# Patient Record
Sex: FEMALE | Race: BLACK OR AFRICAN AMERICAN | NOT HISPANIC OR LATINO | Employment: OTHER | URBAN - METROPOLITAN AREA
[De-identification: names, ages, dates, MRNs, and addresses within clinical notes are randomized per-mention and may not be internally consistent; named-entity substitution may affect disease eponyms.]

---

## 2023-08-26 ENCOUNTER — HOSPITAL ENCOUNTER (INPATIENT)
Facility: HOSPITAL | Age: 73
LOS: 1 days | Discharge: HOME | DRG: 149 | End: 2023-08-28
Attending: EMERGENCY MEDICINE | Admitting: INTERNAL MEDICINE
Payer: COMMERCIAL

## 2023-08-26 ENCOUNTER — APPOINTMENT (OUTPATIENT)
Dept: RADIOLOGY | Facility: HOSPITAL | Age: 73
DRG: 149 | End: 2023-08-26
Payer: COMMERCIAL

## 2023-08-26 ENCOUNTER — APPOINTMENT (EMERGENCY)
Dept: RADIOLOGY | Facility: HOSPITAL | Age: 73
DRG: 149 | End: 2023-08-26
Payer: COMMERCIAL

## 2023-08-26 DIAGNOSIS — R42 DIZZINESS: ICD-10-CM

## 2023-08-26 DIAGNOSIS — R42 LIGHTHEADEDNESS: Primary | ICD-10-CM

## 2023-08-26 PROBLEM — R21 RASH: Status: ACTIVE | Noted: 2023-08-26

## 2023-08-26 PROBLEM — R03.0 ELEVATED BP WITHOUT DIAGNOSIS OF HYPERTENSION: Status: ACTIVE | Noted: 2023-08-26

## 2023-08-26 PROBLEM — E87.6 HYPOKALEMIA: Status: ACTIVE | Noted: 2023-08-26

## 2023-08-26 PROBLEM — R00.1 BRADYCARDIA: Status: ACTIVE | Noted: 2023-08-26

## 2023-08-26 LAB
2HR DELTA HS TROPONIN: 1 NG/L
ALBUMIN SERPL BCP-MCNC: 4.1 G/DL (ref 3.5–5)
ALP SERPL-CCNC: 77 U/L (ref 34–104)
ALT SERPL W P-5'-P-CCNC: 12 U/L (ref 7–52)
ANION GAP SERPL CALCULATED.3IONS-SCNC: 11 MMOL/L
AST SERPL W P-5'-P-CCNC: 11 U/L (ref 13–39)
BACTERIA UR QL AUTO: ABNORMAL /HPF
BASOPHILS # BLD AUTO: 0.02 THOUSANDS/ÂΜL (ref 0–0.1)
BASOPHILS NFR BLD AUTO: 0 % (ref 0–1)
BILIRUB SERPL-MCNC: 1.07 MG/DL (ref 0.2–1)
BILIRUB UR QL STRIP: ABNORMAL
BUN SERPL-MCNC: 15 MG/DL (ref 5–25)
CALCIUM SERPL-MCNC: 9.4 MG/DL (ref 8.4–10.2)
CARDIAC TROPONIN I PNL SERPL HS: 7 NG/L
CARDIAC TROPONIN I PNL SERPL HS: 8 NG/L
CHLORIDE SERPL-SCNC: 99 MMOL/L (ref 96–108)
CLARITY UR: CLEAR
CO2 SERPL-SCNC: 28 MMOL/L (ref 21–32)
COLOR UR: ABNORMAL
CREAT SERPL-MCNC: 0.72 MG/DL (ref 0.6–1.3)
EOSINOPHIL # BLD AUTO: 0.03 THOUSAND/ÂΜL (ref 0–0.61)
EOSINOPHIL NFR BLD AUTO: 1 % (ref 0–6)
ERYTHROCYTE [DISTWIDTH] IN BLOOD BY AUTOMATED COUNT: 13.3 % (ref 11.6–15.1)
GFR SERPL CREATININE-BSD FRML MDRD: 83 ML/MIN/1.73SQ M
GLUCOSE SERPL-MCNC: 80 MG/DL (ref 65–140)
GLUCOSE UR STRIP-MCNC: NEGATIVE MG/DL
HCT VFR BLD AUTO: 48.8 % (ref 34.8–46.1)
HGB BLD-MCNC: 16.2 G/DL (ref 11.5–15.4)
HGB UR QL STRIP.AUTO: ABNORMAL
IMM GRANULOCYTES # BLD AUTO: 0.01 THOUSAND/UL (ref 0–0.2)
IMM GRANULOCYTES NFR BLD AUTO: 0 % (ref 0–2)
KETONES UR STRIP-MCNC: ABNORMAL MG/DL
LEUKOCYTE ESTERASE UR QL STRIP: ABNORMAL
LIPASE SERPL-CCNC: 43 U/L (ref 11–82)
LYMPHOCYTES # BLD AUTO: 1.67 THOUSANDS/ÂΜL (ref 0.6–4.47)
LYMPHOCYTES NFR BLD AUTO: 27 % (ref 14–44)
MCH RBC QN AUTO: 28.2 PG (ref 26.8–34.3)
MCHC RBC AUTO-ENTMCNC: 33.2 G/DL (ref 31.4–37.4)
MCV RBC AUTO: 85 FL (ref 82–98)
MONOCYTES # BLD AUTO: 0.62 THOUSAND/ÂΜL (ref 0.17–1.22)
MONOCYTES NFR BLD AUTO: 10 % (ref 4–12)
MUCOUS THREADS UR QL AUTO: ABNORMAL
NEUTROPHILS # BLD AUTO: 3.8 THOUSANDS/ÂΜL (ref 1.85–7.62)
NEUTS SEG NFR BLD AUTO: 62 % (ref 43–75)
NITRITE UR QL STRIP: NEGATIVE
NON-SQ EPI CELLS URNS QL MICRO: ABNORMAL /HPF
NRBC BLD AUTO-RTO: 0 /100 WBCS
PH UR STRIP.AUTO: 6.5 [PH]
PLATELET # BLD AUTO: 237 THOUSANDS/UL (ref 149–390)
PMV BLD AUTO: 11.8 FL (ref 8.9–12.7)
POTASSIUM SERPL-SCNC: 3.4 MMOL/L (ref 3.5–5.3)
PROT SERPL-MCNC: 7.5 G/DL (ref 6.4–8.4)
PROT UR STRIP-MCNC: NEGATIVE MG/DL
RBC # BLD AUTO: 5.74 MILLION/UL (ref 3.81–5.12)
RBC #/AREA URNS AUTO: ABNORMAL /HPF
SODIUM SERPL-SCNC: 138 MMOL/L (ref 135–147)
SP GR UR STRIP.AUTO: 1.02 (ref 1–1.03)
UROBILINOGEN UR QL STRIP.AUTO: 1 E.U./DL
WBC # BLD AUTO: 6.15 THOUSAND/UL (ref 4.31–10.16)
WBC #/AREA URNS AUTO: ABNORMAL /HPF

## 2023-08-26 PROCEDURE — 85025 COMPLETE CBC W/AUTO DIFF WBC: CPT | Performed by: EMERGENCY MEDICINE

## 2023-08-26 PROCEDURE — 99223 1ST HOSP IP/OBS HIGH 75: CPT | Performed by: INTERNAL MEDICINE

## 2023-08-26 PROCEDURE — 99284 EMERGENCY DEPT VISIT MOD MDM: CPT

## 2023-08-26 PROCEDURE — 70498 CT ANGIOGRAPHY NECK: CPT

## 2023-08-26 PROCEDURE — G1004 CDSM NDSC: HCPCS

## 2023-08-26 PROCEDURE — 83690 ASSAY OF LIPASE: CPT | Performed by: EMERGENCY MEDICINE

## 2023-08-26 PROCEDURE — 93005 ELECTROCARDIOGRAM TRACING: CPT

## 2023-08-26 PROCEDURE — 70450 CT HEAD/BRAIN W/O DYE: CPT

## 2023-08-26 PROCEDURE — 81001 URINALYSIS AUTO W/SCOPE: CPT | Performed by: EMERGENCY MEDICINE

## 2023-08-26 PROCEDURE — 36415 COLL VENOUS BLD VENIPUNCTURE: CPT | Performed by: EMERGENCY MEDICINE

## 2023-08-26 PROCEDURE — 1124F ACP DISCUSS-NO DSCNMKR DOCD: CPT | Performed by: PSYCHIATRY & NEUROLOGY

## 2023-08-26 PROCEDURE — 96360 HYDRATION IV INFUSION INIT: CPT

## 2023-08-26 PROCEDURE — 99285 EMERGENCY DEPT VISIT HI MDM: CPT | Performed by: EMERGENCY MEDICINE

## 2023-08-26 PROCEDURE — 84484 ASSAY OF TROPONIN QUANT: CPT | Performed by: EMERGENCY MEDICINE

## 2023-08-26 PROCEDURE — 70496 CT ANGIOGRAPHY HEAD: CPT

## 2023-08-26 PROCEDURE — 80053 COMPREHEN METABOLIC PANEL: CPT | Performed by: EMERGENCY MEDICINE

## 2023-08-26 RX ORDER — SODIUM CHLORIDE, SODIUM LACTATE, POTASSIUM CHLORIDE, CALCIUM CHLORIDE 600; 310; 30; 20 MG/100ML; MG/100ML; MG/100ML; MG/100ML
125 INJECTION, SOLUTION INTRAVENOUS CONTINUOUS
Status: DISCONTINUED | OUTPATIENT
Start: 2023-08-26 | End: 2023-08-28

## 2023-08-26 RX ORDER — ACETAMINOPHEN 325 MG/1
650 TABLET ORAL EVERY 4 HOURS PRN
Status: DISCONTINUED | OUTPATIENT
Start: 2023-08-26 | End: 2023-08-28 | Stop reason: HOSPADM

## 2023-08-26 RX ORDER — ONDANSETRON 2 MG/ML
4 INJECTION INTRAMUSCULAR; INTRAVENOUS EVERY 6 HOURS PRN
Status: DISCONTINUED | OUTPATIENT
Start: 2023-08-26 | End: 2023-08-28 | Stop reason: HOSPADM

## 2023-08-26 RX ORDER — ATORVASTATIN CALCIUM 40 MG/1
40 TABLET, FILM COATED ORAL EVERY EVENING
Status: DISCONTINUED | OUTPATIENT
Start: 2023-08-26 | End: 2023-08-28 | Stop reason: HOSPADM

## 2023-08-26 RX ORDER — ASPIRIN 81 MG/1
81 TABLET, CHEWABLE ORAL DAILY
Status: DISCONTINUED | OUTPATIENT
Start: 2023-08-26 | End: 2023-08-28 | Stop reason: HOSPADM

## 2023-08-26 RX ORDER — ENOXAPARIN SODIUM 100 MG/ML
40 INJECTION SUBCUTANEOUS DAILY
Status: DISCONTINUED | OUTPATIENT
Start: 2023-08-27 | End: 2023-08-28 | Stop reason: HOSPADM

## 2023-08-26 RX ORDER — ACETAMINOPHEN 325 MG/1
650 TABLET ORAL EVERY 6 HOURS PRN
Status: DISCONTINUED | OUTPATIENT
Start: 2023-08-26 | End: 2023-08-26

## 2023-08-26 RX ORDER — POTASSIUM CHLORIDE 20 MEQ/1
20 TABLET, EXTENDED RELEASE ORAL ONCE
Status: COMPLETED | OUTPATIENT
Start: 2023-08-26 | End: 2023-08-26

## 2023-08-26 RX ORDER — MECLIZINE HYDROCHLORIDE 25 MG/1
25 TABLET ORAL EVERY 8 HOURS SCHEDULED
Status: DISCONTINUED | OUTPATIENT
Start: 2023-08-26 | End: 2023-08-28 | Stop reason: HOSPADM

## 2023-08-26 RX ORDER — ENOXAPARIN SODIUM 100 MG/ML
40 INJECTION SUBCUTANEOUS DAILY
Status: DISCONTINUED | OUTPATIENT
Start: 2023-08-27 | End: 2023-08-26

## 2023-08-26 RX ADMIN — MECLIZINE HYDROCHLORIDE 25 MG: 25 TABLET ORAL at 21:21

## 2023-08-26 RX ADMIN — SODIUM CHLORIDE 1000 ML: 0.9 INJECTION, SOLUTION INTRAVENOUS at 12:22

## 2023-08-26 RX ADMIN — ATORVASTATIN CALCIUM 40 MG: 40 TABLET, FILM COATED ORAL at 21:21

## 2023-08-26 RX ADMIN — SODIUM CHLORIDE, SODIUM LACTATE, POTASSIUM CHLORIDE, AND CALCIUM CHLORIDE 125 ML/HR: .6; .31; .03; .02 INJECTION, SOLUTION INTRAVENOUS at 21:30

## 2023-08-26 RX ADMIN — POTASSIUM CHLORIDE 20 MEQ: 1500 TABLET, EXTENDED RELEASE ORAL at 21:21

## 2023-08-26 RX ADMIN — IOHEXOL 85 ML: 350 INJECTION, SOLUTION INTRAVENOUS at 20:29

## 2023-08-26 RX ADMIN — ASPIRIN 81 MG CHEWABLE TABLET 81 MG: 81 TABLET CHEWABLE at 21:21

## 2023-08-26 NOTE — ASSESSMENT & PLAN NOTE
Noted on presentation, blood pressure elevated. Patient denies any postural lightheadedness  · Telemetry- sinus rhythm and sinus bradycardia.   No evidence of chronotropic incompetence  · TSH-normal  · Monitor

## 2023-08-26 NOTE — ASSESSMENT & PLAN NOTE
Presented with symptoms of unsteadiness, imbalance since 8/23 associated with nausea and vomiting. Patient was unable to get up from bed for 2 days as she was afraid of falling when she attempted to stand up. CT head without any acute abnormality. Neuro exam appears nonfocal  Lab revealed hemoconcentration, hypokalemia, positive ketones in urine due to dehydration and decreased p.o. intake  CTA head and neck without any acute abnormality  Suspect vertigo, likely peripheral rule out other etiologies  Hematemesis resolved.   MRI brain without any acute abnormality   able to ambulate independently  No further GI symptoms  · Telemetry- no advanced arrhythmia  · Neuro checks-remained stable  · LDL 84, hemoglobin A1c 5.6  · Monitor p.o. intake  · Recommend to monitor symptoms  · Symptomatic treatment  · Follow-up with PCP  · PT/OT/ST- remains independent

## 2023-08-26 NOTE — ASSESSMENT & PLAN NOTE
Noted to elevated blood pressure on presentation, no prior history as patient does not have any routine medical follow-up  Blood pressure trend noted  2D echo with EF 50%, concentric hypertrophy, grade 1 diastolic dysfunction. · Advised blood pressure monitoring with consideration of addition of antihypertensive medication  · Patient would like to pursue diet lifestyle modification.   · agrees to follow-up with PCP for monitoring  · Also advised cardiology follow-up

## 2023-08-26 NOTE — ED PROVIDER NOTES
History  Chief Complaint   Patient presents with   • Dizziness     States was unable to get out of bed for 2 days. States very dizzy and gait is unsteady. No trauma. Started on solumedrol dosepak for poison ivy 2 days prior to dizziness. No extremity weakness. Has Nausea and vomiting      Patient presents for evaluation of feeling lightheaded dizzy and unsteady gait. Patient states she was started on Medrol Dosepak 4 days ago for poison ivy. However she stopped it 2 days ago because she started having the symptoms of lightheaded dizziness particularly with standing. Last 2 days unable to get out of bed or ambulate secondary to symptoms. Decreased appetite associated with nausea and vomiting. Denies any abdominal pain. History provided by:  Patient   used: No        None       History reviewed. No pertinent past medical history. History reviewed. No pertinent surgical history. History reviewed. No pertinent family history. I have reviewed and agree with the history as documented. E-Cigarette/Vaping   • E-Cigarette Use Never User      E-Cigarette/Vaping Substances     Social History     Tobacco Use   • Smoking status: Never   • Smokeless tobacco: Never   Vaping Use   • Vaping Use: Never used   Substance Use Topics   • Alcohol use: Yes     Comment: occasional   • Drug use: Never       Review of Systems   Constitutional: Positive for appetite change and fatigue. Negative for chills and fever. HENT: Negative for ear pain and sore throat. Eyes: Negative for pain and visual disturbance. Respiratory: Negative for cough and shortness of breath. Cardiovascular: Negative for chest pain and palpitations. Gastrointestinal: Positive for nausea and vomiting. Negative for abdominal pain and diarrhea. Genitourinary: Negative for dysuria and hematuria. Musculoskeletal: Negative for arthralgias and back pain. Skin: Negative for color change and rash.    Neurological: Positive for dizziness and light-headedness. Negative for seizures, syncope, weakness and numbness. All other systems reviewed and are negative. Physical Exam  Physical Exam  Vitals and nursing note reviewed. Constitutional:       General: She is not in acute distress. Eyes:      General: No scleral icterus. Extraocular Movements: Extraocular movements intact. Conjunctiva/sclera: Conjunctivae normal.      Pupils: Pupils are equal, round, and reactive to light. Cardiovascular:      Rate and Rhythm: Normal rate and regular rhythm. Pulmonary:      Effort: Pulmonary effort is normal. No respiratory distress. Breath sounds: Normal breath sounds. Abdominal:      General: Abdomen is flat. Bowel sounds are normal. There is no distension. Palpations: Abdomen is soft. Tenderness: There is no abdominal tenderness. There is no guarding or rebound. Musculoskeletal:         General: No deformity. Normal range of motion. Skin:     Capillary Refill: Capillary refill takes less than 2 seconds. Neurological:      General: No focal deficit present. Mental Status: She is alert and oriented to person, place, and time. Cranial Nerves: No cranial nerve deficit. Sensory: No sensory deficit. Motor: No weakness.       Coordination: Coordination normal.         Vital Signs  ED Triage Vitals [08/26/23 1149]   Temperature Pulse Respirations Blood Pressure SpO2   97.5 °F (36.4 °C) (!) 52 20 162/77 100 %      Temp Source Heart Rate Source Patient Position - Orthostatic VS BP Location FiO2 (%)   Tympanic Monitor Sitting Left arm --      Pain Score       No Pain           Vitals:    08/27/23 2229 08/28/23 0237 08/28/23 0759 08/28/23 0929   BP: 139/79 146/73 160/92 (!) 179/106   Pulse: 64 (!) 48 (!) 53 (!) 109   Patient Position - Orthostatic VS:             Visual Acuity  Visual Acuity    Flowsheet Row Most Recent Value   L Pupil Size (mm) 2   R Pupil Size (mm) 2   L Pupil Shape Round   R Pupil Shape Round          ED Medications  Medications   atorvastatin (LIPITOR) tablet 40 mg (40 mg Oral Not Given 8/27/23 1748)   acetaminophen (TYLENOL) tablet 650 mg (has no administration in time range)   ondansetron (ZOFRAN) injection 4 mg (has no administration in time range)   lactated ringers infusion (125 mL/hr Intravenous New Bag 8/28/23 0803)   aspirin chewable tablet 81 mg (81 mg Oral Given 8/28/23 0805)   enoxaparin (LOVENOX) subcutaneous injection 40 mg (40 mg Subcutaneous Not Given 8/28/23 0805)   meclizine (ANTIVERT) tablet 25 mg (25 mg Oral Given 8/28/23 0501)   sodium chloride 0.9 % bolus 1,000 mL (0 mL Intravenous Stopped 8/26/23 1322)   potassium chloride (K-DUR,KLOR-CON) CR tablet 20 mEq (20 mEq Oral Given 8/26/23 2121)   iohexol (OMNIPAQUE) 350 MG/ML injection (SINGLE-DOSE) 100 mL (85 mL Intravenous Given 8/26/23 2029)   potassium chloride (K-DUR,KLOR-CON) CR tablet 20 mEq (20 mEq Oral Given 8/27/23 1603)   LORazepam (ATIVAN) tablet 1 mg (1 mg Oral Given 8/28/23 0932)       Diagnostic Studies  Results Reviewed     Procedure Component Value Units Date/Time    HS Troponin I 2hr [[id number]]  (Normal) Collected: 08/26/23 1443    Lab Status: Final result Specimen: Blood from Arm, Left Updated: 08/26/23 1513     hs TnI 2hr 8 ng/L      Delta 2hr hsTnI 1 ng/L     Urine Microscopic [[id number]]  (Abnormal) Collected: 08/26/23 1400    Lab Status: Final result Specimen: Urine Updated: 08/26/23 1422     RBC, UA 0-1 /hpf      WBC, UA 10-20 /hpf      Epithelial Cells Occasional /hpf      Bacteria, UA Occasional /hpf      MUCUS THREADS Occasional    UA (URINE) with reflex to Scope [[id number]]  (Abnormal) Collected: 08/26/23 1400    Lab Status: Final result Specimen: Urine Updated: 08/26/23 1411     Color, UA Light Yellow     Clarity, UA Clear     Specific Gravity, UA 1.025     pH, UA 6.5     Leukocytes, UA Moderate     Nitrite, UA Negative     Protein, UA Negative mg/dl      Glucose, UA Negative mg/dl      Ketones, UA 40 (2+) mg/dl      Urobilinogen, UA 1.0 E.U./dl      Bilirubin, UA Small     Occult Blood, UA Trace-Intact    HS Troponin 0hr (reflex protocol) [[id number]]  (Normal) Collected: 08/26/23 1219    Lab Status: Final result Specimen: Blood from Arm, Left Updated: 08/26/23 1250     hs TnI 0hr 7 ng/L     Comprehensive metabolic panel [[id number]]  (Abnormal) Collected: 08/26/23 1219    Lab Status: Final result Specimen: Blood from Arm, Left Updated: 08/26/23 1245     Sodium 138 mmol/L      Potassium 3.4 mmol/L      Chloride 99 mmol/L      CO2 28 mmol/L      ANION GAP 11 mmol/L      BUN 15 mg/dL      Creatinine 0.72 mg/dL      Glucose 80 mg/dL      Calcium 9.4 mg/dL      AST 11 U/L      ALT 12 U/L      Alkaline Phosphatase 77 U/L      Total Protein 7.5 g/dL      Albumin 4.1 g/dL      Total Bilirubin 1.07 mg/dL      eGFR 83 ml/min/1.73sq m     Narrative:      [place] guidelines for Chronic Kidney Disease (CKD):   •  Stage 1 with normal or high GFR (GFR > 90 mL/min/1.73 square meters)  •  Stage 2 Mild CKD (GFR = 60-89 mL/min/1.73 square meters)  •  Stage 3A Moderate CKD (GFR = 45-59 mL/min/1.73 square meters)  •  Stage 3B Moderate CKD (GFR = 30-44 mL/min/1.73 square meters)  •  Stage 4 Severe CKD (GFR = 15-29 mL/min/1.73 square meters)  •  Stage 5 End Stage CKD (GFR <15 mL/min/1.73 square meters)  Note: GFR calculation is accurate only with a steady state creatinine    Lipase [[id number]]  (Normal) Collected: 08/26/23 1219    Lab Status: Final result Specimen: Blood from Arm, Left Updated: 08/26/23 1245     Lipase 43 u/L     CBC and differential [[id number]]  (Abnormal) Collected: 08/26/23 1219    Lab Status: Final result Specimen: Blood from Arm, Left Updated: 08/26/23 1225     WBC 6.15 Thousand/uL      RBC 5.74 Million/uL      Hemoglobin 16.2 g/dL      Hematocrit 48.8 %      MCV 85 fL      MCH 28.2 pg      MCHC 33.2 g/dL      RDW 13.3 %      MPV 11.8 fL      Platelets 835 Thousands/uL      nRBC 0 /100 WBCs      Neutrophils Relative 62 %      Immat GRANS % 0 %      Lymphocytes Relative 27 %      Monocytes Relative 10 %      Eosinophils Relative 1 %      Basophils Relative 0 %      Neutrophils Absolute 3.80 Thousands/µL      Immature Grans Absolute 0.01 Thousand/uL      Lymphocytes Absolute 1.67 Thousands/µL      Monocytes Absolute 0.62 Thousand/µL      Eosinophils Absolute 0.03 Thousand/µL      Basophils Absolute 0.02 Thousands/µL                  MRI brain wo contrast   Final Result by Spike Macario MD (08/28 1021)      No acute intracranial abnormality. Mild chronic microangiopathy. Workstation performed: [id number]         CTA head and neck w wo contrast   Final Result by Ligia Hewitt MD (08/26 2138)      1. No acute intracranial hemorrhage, mass effect or extra-axial collection. 2.  No hemodynamically significant stenosis, dissection or occlusion of the carotid or vertebral arteries. 3.  No intracranial aneurysm. No hemodynamically significant stenosis or occlusion of the major vessels of the [place]. Workstation performed: [id number]         CT head without contrast   Final Result by Karin Almendraez MD (08/26 0570)      No acute intracranial abnormality.                   Workstation performed: [id number]                    Procedures  ECG 12 Lead Documentation Only    Date/Time: 8/26/2023 12:04 PM    Performed by: Fernando Bedoya DO  Authorized by: Fernando Bedoya DO    ECG reviewed by me, the ED Provider: yes    Patient location:  ED  Interpretation:     Interpretation: abnormal    Rate:     ECG rate:  49    ECG rate assessment: bradycardic    Rhythm:     Rhythm: sinus rhythm    Ectopy:     Ectopy: none    ST segments:     ST segments:  Normal  T waves:     T waves: non-specific    Other findings:     Other findings: LAE and LVH               ED Course                               SBIRT 22yo+    Flowsheet Row Most Recent Value   Initial Alcohol Screen: US AUDIT-C     1. How often do you have a drink containing alcohol? 1 Filed at: 08/26/2023 1151   2. How many drinks containing alcohol do you have on a typical day you are drinking? 0 Filed at: 08/26/2023 1151   3b. FEMALE Any Age, or MALE 65+: How often do you have 4 or more drinks on one occassion? 0 Filed at: 08/26/2023 1151   Audit-C Score 1 Filed at: 08/26/2023 1151   MONTSE: How many times in the past year have you. .. Used an illegal drug or used a prescription medication for non-medical reasons? Never Filed at: 08/26/2023 1151                    Medical Decision Making  Pulse ox 96% on room air indicating adequate oxygenation. Patient still symptomatic on reexam no acute abnormality found giving her inability to get out of bed and ambulate will admit for further evaluation. Case discussed with Dr. Miryam aguayo hospitalist on-call for admission to the medical service. Lightheadedness: acute illness or injury  Amount and/or Complexity of Data Reviewed  Labs: ordered. Radiology: ordered. Risk  Decision regarding hospitalization. Disposition  Final diagnoses:   Lightheadedness     Time reflects when diagnosis was documented in both MDM as applicable and the Disposition within this note     Time User Action Codes Description Comment    8/26/2023  3:25 PM Grecia WERNER Add [R42] Lightheadedness     8/26/2023  7:04 PM Alma Sood Add [R42] Dizziness       ED Disposition     ED Disposition   Admit    Condition   Stable    Date/Time   Sat Aug 26, 2023  3:25 PM    Comment   Case was discussed with Dr. Alireza Mckee and the patient's admission status was agreed to be Admission Status: observation status to the service of Dr. Alireza Mckee. Follow-up Information    None         There are no discharge medications for this patient. No discharge procedures on file.     PDMP Review     None          ED Provider  Electronically Signed by           Monalisa Hernández DO  08/28/23 1037

## 2023-08-26 NOTE — ASSESSMENT & PLAN NOTE
Developed after working in yard cutting bushes.   Developed rash involving face, seen in urgent care center on 8/21, prescribed Medrol Dosepak  Rash improved after 2 days of Medrol Dosepak  Denies any symptoms at present, rash appears to have almost resolved  Monitor

## 2023-08-26 NOTE — ED NOTES
Pt ambulated to bathroom by this RN. Pt reports dizziness and unsteady gait.       Ania Gates RN  08/26/23 7800

## 2023-08-26 NOTE — H&P
History and Physical - [address] [facility]    Patient Information: Shira Ribera 68 y.o. female MRN: [id number]  Unit/Bed#: ED 13 Encounter: [id number]  Admitting Physician: Ariel Yang MD  PCP: No primary care provider on file. Date of Admission:  08/26/23        Hospital Problem List:     Principal Problem:    Dizziness  Active Problems:    Elevated BP without diagnosis of hypertension    Bradycardia    Hypokalemia    Rash      Assessment/Plan:    * Dizziness  Assessment & Plan  Presented with symptoms of unsteadiness, imbalance since 8/23 associated with nausea and vomiting. Patient was unable to get up from bed for 2 days as she was afraid of falling when she attempted to stand up. CT head without any acute abnormality. Neuro exam appears nonfocal  Lab revealed hemoconcentration, hypokalemia, positive ketones in urine due to dehydration and decreased p.o. intake  Suspect vertigo, likely peripheral rule out other etiologies  · Telemetry  · Neuro checks  · Aspirin 81 mg  · Lipitor 40 mg  · Permissive hypertension  · CTA head and neck  · Meclizine every 8 hours  · IV fluids  · Symptomatic treatment  · LDL, hemoglobin A1c  · 2D echo with bubble study  · MRI of the brain  · PT/OT/ST      Bradycardia  Assessment & Plan  Noted on presentation, blood pressure elevated. Patient denies any postural lightheadedness  · Telemetry  · TSH  · Monitor    Elevated BP without diagnosis of hypertension  Assessment & Plan  Noted to elevated blood pressure on presentation, no prior history as patient does not have any routine medical follow-up  Suspect stress related  · Monitor blood pressure trend at present    Rash  Assessment & Plan  Developed after working in yard [employer].   Developed rash involving face, seen in urgent care center on 8/21, prescribed Medrol Dosepak  Rash improved after 2 days of Medrol Dosepak  Denies any symptoms at present, rash appears to have almost resolved  Monitor    Hypokalemia  Assessment & Plan  secondary to decreased p.o. intake  Replete and monitor            VTE Prophylaxis: Enoxaparin (Lovenox)  / sequential compression device   Code Status: Level 1 - Full Code    Anticipated Length of Stay:  Patient will be admitted on an Observation basis with an anticipated length of stay of  < 2 midnights. Justification for Hospital Stay: Unsteadiness, rule out vertigo/TIA    Total Time for Visit, including Counseling / Coordination of Care: 45 minutes. Greater than 50% of this total time spent on direct patient counseling and coordination of care. Chief Complaint:     Dizziness (States was unable to get out of bed for 2 days. States very dizzy and gait is unsteady. No trauma. Started on solumedrol dosepak for poison ivy 2 days prior to dizziness. No extremity weakness. Has Nausea and vomiting )    History of Present Illness:    Beth Matos is a 68 y.o. female with no known medical history, no routine medical follow-up who presents with symptoms of unsteadiness and difficulty in ambulation since last Wednesday. Patient reported that earlier in the week , she had developed pruritic rash of her face and body for which she was evaluated at urgent care center and was diagnosed to have poison ivy and was given Medrol Dosepak. Next day on Wednesday, she felt a little dizzy which she described as feeling of unsteadiness. Next 2 days her symptoms progressed to the extent that she was feeling unsteady that she was going to fall every time she is attempted to stand up. This was associated with nonbloody nonbilious vomiting. Patient reported that she was able to walk but she had to hold onto support for ambulation. Due to her symptoms she has not been able to eat or move around over the last 2  days and then she presented to ED for further evaluation.   Patient denies any headache, fall, trauma, visual or speech abnormality, double vision, tinnitus, recent URI symptoms, chest pain, shortness of breath, palpitation, lightheadedness, abdominal pain or urinary abnormalities. Patient also denied any focal weakness numbness. Patient was evaluated in ED, patient was afebrile bradycardic and hypotensive saturating adequately on room air. CT head did not reveal any acute abnormality. Lab revealed hemoconcentration mild hypokalemia cardiac markers were negative. UA revealed positive ketones moderate leukocytes, occasional bacteriuria. Patient was given IV fluids and was subsequently admitted. Patient ambulated to the bathroom in the emergency room noted symptoms of dizziness with unsteady gait. Reported that she is currently not feeling symptoms as she is lying in bed. Patient reports that her rash has improved and she does not feel any pruritus. Patient reports that she does not have a PCP and does not have routine medical evaluation. She does report family history of hypertension diabetes and cardiac disease. Review of Systems:    Review of Systems   Constitutional: Positive for activity change. Negative for chills and fever. HENT: Negative for congestion, sore throat and tinnitus. Respiratory: Negative for cough and shortness of breath. Cardiovascular: Negative for chest pain and palpitations. Gastrointestinal: Positive for nausea and vomiting. Negative for abdominal pain, blood in stool, constipation and diarrhea. Genitourinary: Negative for difficulty urinating. Musculoskeletal: Positive for gait problem. Negative for arthralgias. Skin: Positive for rash. Negative for color change. Neurological: Negative for syncope, facial asymmetry, speech difficulty, weakness, light-headedness, numbness and headaches. Psychiatric/Behavioral: Negative for confusion. Past Medical and Surgical History:     History reviewed. No pertinent past medical history. History reviewed. No pertinent surgical history.     Meds/Allergies:    PTA meds:   None Allergies: No Known Allergies  History:     Marital Status: /Civil Union     Substance Use History:   Social History     Substance and Sexual Activity   Alcohol Use Yes    Comment: occasional     Social History     Tobacco Use   Smoking Status Never   Smokeless Tobacco Never     Social History     Substance and Sexual Activity   Drug Use Never       Family History:    History reviewed. No pertinent family history. Physical Exam:     Vitals:   Blood Pressure: 166/89 (08/26/23 1534)  Pulse: (!) 54 (08/26/23 1532)  Temperature: 97.5 °F (36.4 °C) (08/26/23 1149)  Temp Source: Tympanic (08/26/23 1149)  Respirations: 20 (08/26/23 1524)  Height: 4' 9" (144.8 cm) (08/26/23 1149)  Weight - Scale: 59 kg (130 lb) (08/26/23 1149)  SpO2: 99 % (08/26/23 1534)    Physical Exam  Constitutional:       General: She is not in acute distress. HENT:      Head: Normocephalic and atraumatic. Mouth/Throat:      Mouth: Mucous membranes are dry. Eyes:      Extraocular Movements: Extraocular movements intact. Right eye: No nystagmus. Left eye: No nystagmus. Pupils: Pupils are equal, round, and reactive to light. Cardiovascular:      Rate and Rhythm: Normal rate. Pulmonary:      Effort: Pulmonary effort is normal. No respiratory distress. Breath sounds: Normal breath sounds. No wheezing or rales. Abdominal:      General: Bowel sounds are normal. There is no distension. Palpations: Abdomen is soft. Tenderness: There is no abdominal tenderness. There is no guarding or rebound. Musculoskeletal:      Cervical back: Neck supple. Right lower leg: No edema. Left lower leg: No edema. Skin:     General: Skin is warm and dry. Findings: Rash (Mild erythematous rash on face and upper body) present. Neurological:      General: No focal deficit present. Mental Status: She is alert and oriented to person, place, and time. Mental status is at baseline.       GCS: GCS eye subscore is 4. GCS verbal subscore is 5. GCS motor subscore is 6. Cranial Nerves: No cranial nerve deficit. Sensory: No sensory deficit. Motor: No weakness. Coordination: Finger-Nose-Finger Test normal.      Gait: Gait normal.   Psychiatric:         Mood and Affect: Mood normal.                 Lab Results: I have personally reviewed pertinent reports. Results from last 7 days   Lab Units 08/26/23  1219   WBC Thousand/uL 6.15   HEMOGLOBIN g/dL 16.2*   HEMATOCRIT % 48.8*   PLATELETS Thousands/uL 237   NEUTROS PCT % 62   LYMPHS PCT % 27   MONOS PCT % 10   EOS PCT % 1     Results from last 7 days   Lab Units 08/26/23  1219   POTASSIUM mmol/L 3.4*   CHLORIDE mmol/L 99   CO2 mmol/L 28   BUN mg/dL 15   CREATININE mg/dL 0.72   CALCIUM mg/dL 9.4   ALK PHOS U/L 77   ALT U/L 12   AST U/L 11*           Imaging: I have personally reviewed pertinent reports. CT head without contrast    Result Date: 8/26/2023  Narrative: CT BRAIN - WITHOUT CONTRAST INDICATION:   headache/lightheaded. COMPARISON:  None. TECHNIQUE:  CT examination of the brain was performed. Multiplanar 2D reformatted images were created from the source data. Radiation dose length product (DLP) for this visit:  866 mGy-cm . This examination, like all CT scans performed in the [facility], was performed utilizing techniques to minimize radiation dose exposure, including the use of iterative reconstruction and automated exposure control. IMAGE QUALITY:  Diagnostic. FINDINGS: PARENCHYMA:  No intracranial mass, mass effect or midline shift. No CT signs of acute infarction. No acute parenchymal hemorrhage. VENTRICLES AND EXTRA-AXIAL SPACES:  Normal for the patient's age. VISUALIZED ORBITS: Normal visualized orbits. PARANASAL SINUSES: Normal visualized paranasal sinuses. CALVARIUM AND EXTRACRANIAL SOFT TISSUES:  Normal.     Impression: No acute intracranial abnormality.  Workstation performed: [id number]       CT head without contrast   Final Result      No acute intracranial abnormality. Workstation performed: [id number]             EKG, Pathology, and Other Studies Reviewed on Admission:   · EKG-sinus bradycardia at 49 bpm, LAE, LVH QTc 462    Allscripts/EPIC Records Reviewed: Yes     ** Please Note: "This note has been constructed using a voice recognition system. Therefore there may be syntax, spelling, and/or grammatical errors.  Please call if you have any questions. "**

## 2023-08-27 LAB
ALBUMIN SERPL BCP-MCNC: 3.6 G/DL (ref 3.5–5)
ALP SERPL-CCNC: 67 U/L (ref 34–104)
ALT SERPL W P-5'-P-CCNC: 13 U/L (ref 7–52)
ANION GAP SERPL CALCULATED.3IONS-SCNC: 8 MMOL/L
AST SERPL W P-5'-P-CCNC: 12 U/L (ref 13–39)
BACTERIA UR QL AUTO: ABNORMAL /HPF
BILIRUB SERPL-MCNC: 0.9 MG/DL (ref 0.2–1)
BILIRUB UR QL STRIP: NEGATIVE
BUN SERPL-MCNC: 11 MG/DL (ref 5–25)
CALCIUM SERPL-MCNC: 8.7 MG/DL (ref 8.4–10.2)
CHLORIDE SERPL-SCNC: 102 MMOL/L (ref 96–108)
CHOLEST SERPL-MCNC: 142 MG/DL
CLARITY UR: CLEAR
CO2 SERPL-SCNC: 27 MMOL/L (ref 21–32)
COLOR UR: ABNORMAL
CREAT SERPL-MCNC: 0.69 MG/DL (ref 0.6–1.3)
ERYTHROCYTE [DISTWIDTH] IN BLOOD BY AUTOMATED COUNT: 13.5 % (ref 11.6–15.1)
EST. AVERAGE GLUCOSE BLD GHB EST-MCNC: 114 MG/DL
GFR SERPL CREATININE-BSD FRML MDRD: 86 ML/MIN/1.73SQ M
GLUCOSE SERPL-MCNC: 117 MG/DL (ref 65–140)
GLUCOSE UR STRIP-MCNC: NEGATIVE MG/DL
HBA1C MFR BLD: 5.6 %
HCT VFR BLD AUTO: 46.4 % (ref 34.8–46.1)
HDLC SERPL-MCNC: 46 MG/DL
HGB BLD-MCNC: 15.8 G/DL (ref 11.5–15.4)
HGB UR QL STRIP.AUTO: NEGATIVE
KETONES UR STRIP-MCNC: NEGATIVE MG/DL
LDLC SERPL CALC-MCNC: 84 MG/DL (ref 0–100)
LEUKOCYTE ESTERASE UR QL STRIP: ABNORMAL
MAGNESIUM SERPL-MCNC: 1.9 MG/DL (ref 1.9–2.7)
MCH RBC QN AUTO: 28.9 PG (ref 26.8–34.3)
MCHC RBC AUTO-ENTMCNC: 34.1 G/DL (ref 31.4–37.4)
MCV RBC AUTO: 85 FL (ref 82–98)
NITRITE UR QL STRIP: NEGATIVE
NON-SQ EPI CELLS URNS QL MICRO: ABNORMAL /HPF
PH UR STRIP.AUTO: 6.5 [PH]
PLATELET # BLD AUTO: 198 THOUSANDS/UL (ref 149–390)
PMV BLD AUTO: 11.5 FL (ref 8.9–12.7)
POTASSIUM SERPL-SCNC: 3.3 MMOL/L (ref 3.5–5.3)
PROT SERPL-MCNC: 6.8 G/DL (ref 6.4–8.4)
PROT UR STRIP-MCNC: NEGATIVE MG/DL
RBC # BLD AUTO: 5.47 MILLION/UL (ref 3.81–5.12)
RBC #/AREA URNS AUTO: ABNORMAL /HPF
SODIUM SERPL-SCNC: 137 MMOL/L (ref 135–147)
SP GR UR STRIP.AUTO: <=1.005 (ref 1–1.03)
TRIGL SERPL-MCNC: 61 MG/DL
TSH SERPL DL<=0.05 MIU/L-ACNC: 4.14 UIU/ML (ref 0.45–4.5)
UROBILINOGEN UR QL STRIP.AUTO: 1 E.U./DL
WBC # BLD AUTO: 6.77 THOUSAND/UL (ref 4.31–10.16)
WBC #/AREA URNS AUTO: ABNORMAL /HPF

## 2023-08-27 PROCEDURE — 85027 COMPLETE CBC AUTOMATED: CPT | Performed by: INTERNAL MEDICINE

## 2023-08-27 PROCEDURE — 80061 LIPID PANEL: CPT | Performed by: INTERNAL MEDICINE

## 2023-08-27 PROCEDURE — 80053 COMPREHEN METABOLIC PANEL: CPT | Performed by: INTERNAL MEDICINE

## 2023-08-27 PROCEDURE — 83036 HEMOGLOBIN GLYCOSYLATED A1C: CPT | Performed by: INTERNAL MEDICINE

## 2023-08-27 PROCEDURE — 81001 URINALYSIS AUTO W/SCOPE: CPT | Performed by: INTERNAL MEDICINE

## 2023-08-27 PROCEDURE — 84443 ASSAY THYROID STIM HORMONE: CPT | Performed by: INTERNAL MEDICINE

## 2023-08-27 PROCEDURE — 99232 SBSQ HOSP IP/OBS MODERATE 35: CPT | Performed by: INTERNAL MEDICINE

## 2023-08-27 PROCEDURE — 97161 PT EVAL LOW COMPLEX 20 MIN: CPT

## 2023-08-27 PROCEDURE — 83735 ASSAY OF MAGNESIUM: CPT | Performed by: INTERNAL MEDICINE

## 2023-08-27 RX ORDER — POTASSIUM CHLORIDE 20 MEQ/1
20 TABLET, EXTENDED RELEASE ORAL
Status: COMPLETED | OUTPATIENT
Start: 2023-08-27 | End: 2023-08-27

## 2023-08-27 RX ADMIN — POTASSIUM CHLORIDE 20 MEQ: 1500 TABLET, EXTENDED RELEASE ORAL at 16:03

## 2023-08-27 RX ADMIN — MECLIZINE HYDROCHLORIDE 25 MG: 25 TABLET ORAL at 21:09

## 2023-08-27 RX ADMIN — SODIUM CHLORIDE, SODIUM LACTATE, POTASSIUM CHLORIDE, AND CALCIUM CHLORIDE 125 ML/HR: .6; .31; .03; .02 INJECTION, SOLUTION INTRAVENOUS at 16:32

## 2023-08-27 RX ADMIN — ASPIRIN 81 MG CHEWABLE TABLET 81 MG: 81 TABLET CHEWABLE at 08:54

## 2023-08-27 RX ADMIN — MECLIZINE HYDROCHLORIDE 25 MG: 25 TABLET ORAL at 05:42

## 2023-08-27 RX ADMIN — SODIUM CHLORIDE, SODIUM LACTATE, POTASSIUM CHLORIDE, AND CALCIUM CHLORIDE 125 ML/HR: .6; .31; .03; .02 INJECTION, SOLUTION INTRAVENOUS at 06:23

## 2023-08-27 RX ADMIN — POTASSIUM CHLORIDE 20 MEQ: 1500 TABLET, EXTENDED RELEASE ORAL at 12:53

## 2023-08-27 RX ADMIN — MECLIZINE HYDROCHLORIDE 25 MG: 25 TABLET ORAL at 13:02

## 2023-08-27 RX ADMIN — SODIUM CHLORIDE, SODIUM LACTATE, POTASSIUM CHLORIDE, AND CALCIUM CHLORIDE 125 ML/HR: .6; .31; .03; .02 INJECTION, SOLUTION INTRAVENOUS at 23:59

## 2023-08-27 NOTE — PLAN OF CARE
Problem: Potential for Falls  Goal: Patient will remain free of falls  Description: INTERVENTIONS:  - Educate patient/family on patient safety including physical limitations  - Instruct patient to call for assistance with activity   - Consult OT/PT to assist with strengthening/mobility   - Keep Call bell within reach  - Keep bed low and locked with side rails adjusted as appropriate  - Keep care items and personal belongings within reach  - Initiate and maintain comfort rounds  - Make Fall Risk Sign visible to staff  - Offer Toileting every [id number], in advance of need  - Initiate/Maintain bed alarm  - Apply yellow socks and bracelet for high fall risk patients  - Consider moving patient to room near nurses station  Outcome: Progressing     Problem: PAIN - ADULT  Goal: Verbalizes/displays adequate comfort level or baseline comfort level  Description: Interventions:  - Encourage patient to monitor pain and request assistance  - Assess pain using appropriate pain scale  - Administer analgesics based on type and severity of pain and evaluate response  - Implement non-pharmacological measures as appropriate and evaluate response  - Consider cultural and social influences on pain and pain management  - Notify physician/advanced practitioner if interventions unsuccessful or patient reports new pain  Outcome: Progressing     Problem: SAFETY ADULT  Goal: Patient will remain free of falls  Description: INTERVENTIONS:  - Educate patient/family on patient safety including physical limitations  - Instruct patient to call for assistance with activity   - Consult OT/PT to assist with strengthening/mobility   - Keep Call bell within reach  - Keep bed low and locked with side rails adjusted as appropriate  - Keep care items and personal belongings within reach  - Initiate and maintain comfort rounds  - Make Fall Risk Sign visible to staff  - Offer Toileting every [id number], in advance of need  - Initiate/Maintain bed alarm  - Apply yellow socks and bracelet for high fall risk patients  - Consider moving patient to room near nurses station  Outcome: Progressing     Problem: DISCHARGE PLANNING  Goal: Discharge to home or other facility with appropriate resources  Description: INTERVENTIONS:  - Identify barriers to discharge w/patient and caregiver  - Arrange for needed discharge resources and transportation as appropriate  - Identify discharge learning needs (meds, wound care, etc.)  - Arrange for interpretive services to assist at discharge as needed  - Refer to Case Management Department for coordinating discharge planning if the patient needs post-hospital services based on physician/advanced practitioner order or complex needs related to functional status, cognitive ability, or social support system  Outcome: Progressing     Problem: Knowledge Deficit  Goal: Patient/family/caregiver demonstrates understanding of disease process, treatment plan, medications, and discharge instructions  Description: Complete learning assessment and assess knowledge base.   Interventions:  - Provide teaching at level of understanding  - Provide teaching via preferred learning methods  Outcome: Progressing     Problem: NEUROSENSORY - ADULT  Goal: Achieves stable or improved neurological status  Description: INTERVENTIONS  - Monitor and report changes in neurological status  - Monitor vital signs such as temperature, blood pressure, glucose, and any other labs ordered   - Initiate measures to prevent increased intracranial pressure  - Monitor for seizure activity and implement precautions if appropriate      Outcome: Progressing     Problem: CARDIOVASCULAR - ADULT  Goal: Absence of cardiac dysrhythmias or at baseline rhythm  Description: INTERVENTIONS:  - Continuous cardiac monitoring, vital signs, obtain 12 lead EKG if ordered  - Administer antiarrhythmic and heart rate control medications as ordered  - Monitor electrolytes and administer replacement therapy as ordered  Outcome: Progressing

## 2023-08-27 NOTE — PLAN OF CARE
Problem: Potential for Falls  Goal: Patient will remain free of falls  Description: INTERVENTIONS:  - Educate patient/family on patient safety including physical limitations  - Instruct patient to call for assistance with activity   - Consult OT/PT to assist with strengthening/mobility   - Keep Call bell within reach  - Keep bed low and locked with side rails adjusted as appropriate  - Keep care items and personal belongings within reach  - Initiate and maintain comfort rounds  - Make Fall Risk Sign visible to staff  - Offer Toileting every [id number], in advance of need  - Initiate/Maintain bed alarm  - Apply yellow socks and bracelet for high fall risk patients  - Consider moving patient to room near nurses station  Outcome: Progressing     Problem: PAIN - ADULT  Goal: Verbalizes/displays adequate comfort level or baseline comfort level  Description: Interventions:  - Encourage patient to monitor pain and request assistance  - Assess pain using appropriate pain scale  - Administer analgesics based on type and severity of pain and evaluate response  - Implement non-pharmacological measures as appropriate and evaluate response  - Consider cultural and social influences on pain and pain management  - Notify physician/advanced practitioner if interventions unsuccessful or patient reports new pain  Outcome: Progressing     Problem: SAFETY ADULT  Goal: Patient will remain free of falls  Description: INTERVENTIONS:  - Educate patient/family on patient safety including physical limitations  - Instruct patient to call for assistance with activity   - Consult OT/PT to assist with strengthening/mobility   - Keep Call bell within reach  - Keep bed low and locked with side rails adjusted as appropriate  - Keep care items and personal belongings within reach  - Initiate and maintain comfort rounds  - Make Fall Risk Sign visible to staff  - Offer Toileting every [id number], in advance of need  - Initiate/Maintain bed alarm  - Apply yellow socks and bracelet for high fall risk patients  - Consider moving patient to room near nurses station  Outcome: Progressing     Problem: DISCHARGE PLANNING  Goal: Discharge to home or other facility with appropriate resources  Description: INTERVENTIONS:  - Identify barriers to discharge w/patient and caregiver  - Arrange for needed discharge resources and transportation as appropriate  - Identify discharge learning needs (meds, wound care, etc.)  - Arrange for interpretive services to assist at discharge as needed  - Refer to Case Management Department for coordinating discharge planning if the patient needs post-hospital services based on physician/advanced practitioner order or complex needs related to functional status, cognitive ability, or social support system  Outcome: Progressing     Problem: Knowledge Deficit  Goal: Patient/family/caregiver demonstrates understanding of disease process, treatment plan, medications, and discharge instructions  Description: Complete learning assessment and assess knowledge base.   Interventions:  - Provide teaching at level of understanding  - Provide teaching via preferred learning methods  Outcome: Progressing     Problem: NEUROSENSORY - ADULT  Goal: Achieves stable or improved neurological status  Description: INTERVENTIONS  - Monitor and report changes in neurological status  - Monitor vital signs such as temperature, blood pressure, glucose, and any other labs ordered   - Initiate measures to prevent increased intracranial pressure  - Monitor for seizure activity and implement precautions if appropriate      Outcome: Progressing     Problem: CARDIOVASCULAR - ADULT  Goal: Absence of cardiac dysrhythmias or at baseline rhythm  Description: INTERVENTIONS:  - Continuous cardiac monitoring, vital signs, obtain 12 lead EKG if ordered  - Administer antiarrhythmic and heart rate control medications as ordered  - Monitor electrolytes and administer replacement therapy as ordered  Outcome: Progressing

## 2023-08-27 NOTE — OCCUPATIONAL THERAPY NOTE
Occupational Therapy     08/27/23 1018   Note Type   Note type Evaluation; Cancelled Session   Additional Comments Patient is independent with ADLS. No skilled OT needs at this time per PT.    Licensure   [place] License Number  Elisabeth Calix, [id number] [facility], OTR/L  [id number]

## 2023-08-27 NOTE — PHYSICAL THERAPY NOTE
PT EVALUATION    Patient is at her baseline level of function. Patient remains independent with ADLs and ambulation. No skilled PT or OT needs noted at this time. 08/27/23 1015   PT Last Visit   PT Visit Date 08/27/23   Note Type   Note type Evaluation   Pain Assessment   Pain Assessment Tool 0-10   Pain Score No Pain   Home Living   Type of Home House   Home Layout Two level   Prior Function   Level of [place] Independent with functional mobility; Independent with ADLs   Lives With Spouse   General   Additional Pertinent History Pt admitted with a 2 day history of dizziness . Pt was bradycardic and hypertensive upon admission. Family/Caregiver Present No   Cognition   Overall Cognitive Status WFL   Arousal/Participation Alert   Orientation Level Oriented X4   Following Commands Follows all commands and directions without difficulty   Subjective   Subjective "I feel fine"   RUE Assessment   RUE Assessment   (ROM WFL, MMT 5/5)   LUE Assessment   LUE Assessment   (ROM WFL, MMT 5/5)   RLE Assessment   RLE Assessment   (ROM WFL, MMT 5/5)   LLE Assessment   LLE Assessment   (ROM WFL, MMT 5/5)   Bed Mobility   Supine to Sit 7  Independent   Sit to Supine 7  Independent   Transfers   Sit to Stand 7  Independent   Stand to Sit 7  Independent   Stand pivot 7  Independent   Ambulation/Elevation   Gait pattern WNL   Gait Assistance 7  Independent   Distance 250 feet   Balance   Static Sitting Normal   Dynamic Sitting Normal   Static Standing Normal   Dynamic Standing Normal   Ambulatory Normal   Higher level balance   (rombert eyes closed, no sway)   Assessment   Prognosis Good   Problem List   (none)   Assessment Patient is an 68y.o. year old female seen for Physical Therapy evaluation. Patient admitted with Dizziness. Comorbidities affecting patient's physical performance include: none. Personal factors affecting patient at time of initial evaluation include: none .  Prior to admission, patient was independent with functional mobility without assistive device and independent with ADLS. Please find objective findings from Physical Therapy assessment regarding body systems outlined above with impairments and limitations including baseline. The Barthel Index was used as a functional outcome tool presenting with a score of Barthel Index Score: 100 today indicating no limitations of functional mobility and ADLS. Patient's clinical presentation is currently stable  as seen in patient's presentation of baseline. As demonstrated by objective findings, the assigned level of complexity for this evaluation is low. The patient's AM-PAC Basic Mobility Inpatient Short Form Raw Score is 24. A Raw score of greater than 16 suggests the patient may benefit from discharge to home. Goals   Patient Goals "go home"   Recommendation   PT Discharge Recommendation No rehabilitation needs   AM-PAC Basic Mobility Inpatient   Turning in Flat Bed Without Bedrails 4   Lying on Back to Sitting on Edge of Flat Bed Without Bedrails 4   Moving Bed to Chair 4   Standing Up From Chair Using Arms 4   Walk in Room 4   Climb 3-5 Stairs With Railing 4   Basic Mobility Inpatient Raw Score 24   Basic Mobility Standardized Score 57.68   Highest Level Of Mobility   JH-HLM Goal 8: Walk 250 feet or more   JH-HLM Achieved 8: Walk 250 feet ot more   Modified Socorro Scale   Modified Socorro Scale 0   Barthel Index   Feeding 10   Bathing 5   Grooming Score 5   Dressing Score 10   Bladder Score 10   Bowels Score 10   Toilet Use Score 10   Transfers (Bed/Chair) Score 15   Mobility (Level Surface) Score 15   Stairs Score 10   Barthel Index Score 100   End of Consult   Patient Position at End of Consult All needs within reach; Supine   Licensure   [place] License Number  Anastasiia Roman PT [id number]

## 2023-08-27 NOTE — PROGRESS NOTES
[facility] Medicine Progress Note  Patient: Phill Martin 68 y.o. female   MRN: [id number]  PCP: No primary care provider on file. Unit/Bed#: [address] [place] [address] Encounter: [id number]  Date Of Visit: 08/27/23    Problem List:    Principal Problem:    Dizziness  Active Problems:    Elevated BP without diagnosis of hypertension    Bradycardia    Hypokalemia    Rash      Assessment & Plan:    * Dizziness  Assessment & Plan  Presented with symptoms of unsteadiness, imbalance since 8/23 associated with nausea and vomiting. Patient was unable to get up from bed for 2 days as she was afraid of falling when she attempted to stand up. CT head without any acute abnormality. Neuro exam appears nonfocal  Lab revealed hemoconcentration, hypokalemia, positive ketones in urine due to dehydration and decreased p.o. intake  CTA head and neck without any acute abnormality  Suspect vertigo, likely peripheral rule out other etiologies  Symptoms are improving  Reports only mild unsteadiness today but able to ambulate independently  No further GI symptoms  · Telemetry  · Neuro checks  · Aspirin 81 mg  · Lipitor 40 mg  · Permissive hypertension  · Meclizine every 8 hours  · IV fluids, taper as p.o. intake improves  · Symptomatic treatment  · LDL 84, hemoglobin A1c 5.6  · 2D echo with bubble study  · MRI of the brain  · PT/OT/ST      Bradycardia  Assessment & Plan  Noted on presentation, blood pressure elevated. Patient denies any postural lightheadedness  · Telemetry- sinus rhythm and sinus bradycardia.   No evidence of chronotropic incompetence  · TSH-normal  · Monitor    Elevated BP without diagnosis of hypertension  Assessment & Plan  Noted to elevated blood pressure on presentation, no prior history as patient does not have any routine medical follow-up  Suspect stress related  Blood pressure trend is improving  · Monitor blood pressure trend at present    Rash  Assessment & Plan  Developed after working in yard [employer]. Developed rash involving face, seen in urgent care center on , prescribed Medrol Dosepak  Rash improved after 2 days of Medrol Dosepak  Denies any symptoms at present, rash appears to have almost resolved  Monitor    Hypokalemia  Assessment & Plan  secondary to decreased p.o. intake  Replete and monitor            VTE Pharmacologic Prophylaxis: VTE Score: 8 Moderate Risk (Score 3-4) - Pharmacological DVT Prophylaxis Ordered: enoxaparin (Lovenox). Patient Centered Rounds: I performed bedside rounds with nursing staff today. Discussions with Specialists or Other Care Team Provider: yes    Education and Discussions with Family / Patient: Updated  (Family) at bedside. Time Spent for Care: 45 minutes. More than 50% of total time spent on counseling and coordination of care as described above. Current Length of Stay: 0 day(s)  Current Patient Status: Inpatient   Certification Statement: The patient, admitted on an observation basis, will now require > 2 midnight hospital stay due to Ongoing work-up for dizziness  Discharge Plan: Anticipate discharge tomorrow to home.     Code Status: Level 1 - Full Code    Subjective:   Noted to be sitting up in chair having lunch  Reports improvement in unsteadiness  Reports that she feels slightly unsteady when she first stands up but otherwise she is able to ambulate well subsequently    Denies any headache, lightheadedness, vision speech abnormality, double vision of ringing of ears    Denies any further GI symptoms  Tolerating diet    She reports that she is not sure if she would like to stay in the hospital for further work-up as she prefers to go home today    Objective:     Vitals:   Temp (24hrs), Av.3 °F (36.8 °C), Min:97.5 °F (36.4 °C), Max:99.2 °F (37.3 °C)    Temp:  [97.5 °F (36.4 °C)-99.2 °F (37.3 °C)] 98 °F (36.7 °C)  HR:  [46-91] 65  Resp:  [14-20] 18  BP: (129-187)/(54-89) 139/89  SpO2:  [91 %-100 %] 98 %  Body mass index is 28.13 kg/m². Input and Output Summary (last 24 hours): Intake/Output Summary (Last 24 hours) at 8/27/2023 0912  Last data filed at 8/26/2023 1322  Gross per 24 hour   Intake 1000 ml   Output --   Net 1000 ml       Physical Exam:   Physical Exam  Constitutional:       General: She is not in acute distress. HENT:      Head: Normocephalic and atraumatic. Cardiovascular:      Rate and Rhythm: Normal rate. Pulmonary:      Effort: Pulmonary effort is normal. No respiratory distress. Breath sounds: Normal breath sounds. No wheezing or rales. Abdominal:      General: Bowel sounds are normal. There is no distension. Palpations: Abdomen is soft. Tenderness: There is no abdominal tenderness. There is no guarding or rebound. Musculoskeletal:      Cervical back: Neck supple. Right lower leg: No edema. Left lower leg: No edema. Skin:     General: Skin is warm and dry. Findings: No rash. Neurological:      Mental Status: She is alert. Mental status is at baseline.    Psychiatric:         Mood and Affect: Mood normal.         Additional Data:     Labs:  Results from last 7 days   Lab Units 08/26/23  1219   WBC Thousand/uL 6.15   HEMOGLOBIN g/dL 16.2*   HEMATOCRIT % 48.8*   PLATELETS Thousands/uL 237   NEUTROS PCT % 62   LYMPHS PCT % 27   MONOS PCT % 10   EOS PCT % 1     Results from last 7 days   Lab Units 08/26/23  1219   SODIUM mmol/L 138   POTASSIUM mmol/L 3.4*   CHLORIDE mmol/L 99   CO2 mmol/L 28   BUN mg/dL 15   CREATININE mg/dL 0.72   ANION GAP mmol/L 11   CALCIUM mg/dL 9.4   ALBUMIN g/dL 4.1   TOTAL BILIRUBIN mg/dL 1.07*   ALK PHOS U/L 77   ALT U/L 12   AST U/L 11*   GLUCOSE RANDOM mg/dL 80                       Lines/Drains:  Invasive Devices     Peripheral Intravenous Line  Duration           Peripheral IV 08/26/23 Left Antecubital <1 day                  Telemetry:  Telemetry Orders (From admission, onward)             24 Hour Telemetry Monitoring  Continuous x 24 Hours (Telem)        Question:  Reason for 24 Hour Telemetry  Answer:  TIA/Suspected CVA/ Confirmed CVA                 Telemetry Reviewed: Sinus Bradycardia  Indication for Continued Telemetry Use: Arrthymias requiring medical therapy             Imaging: Reviewed radiology reports from this admission including: CT head    Recent Cultures (last 7 days):         Last 24 Hours Medication List:   Current Facility-Administered Medications   Medication Dose Route Frequency Provider Last Rate   • acetaminophen  650 mg Oral Q4H PRN Vic Andrews MD     • aspirin  81 mg Oral Daily Vic Andrews MD     • atorvastatin  40 mg Oral QPM Vic Andrews MD     • enoxaparin  40 mg Subcutaneous Daily Vic Andrews MD     • lactated ringers  125 mL/hr Intravenous Continuous Vic Andrews  mL/hr (08/27/23 5930)   • meclizine  25 mg Oral Q8H [facility] Vic Andrews MD     • ondansetron  4 mg Intravenous Q6H PRN Vic Andrews MD          Today, Patient Was Seen By: Vic Andrews MD    ** Please Note: "This note has been constructed using a voice recognition system. Therefore there may be syntax, spelling, and/or grammatical errors.  Please call if you have any questions. "**

## 2023-08-27 NOTE — UTILIZATION REVIEW
Initial Clinical Review    Observation 8/26/23 @ [id number] converted to inpatient admission 8/28/23 @ [phone] for continued care & tx for dizziness. Admission: Date/Time/Statement:   Admission Orders (From admission, onward)     Ordered        08/28/23 0504  Inpatient Admission  Once            08/26/23 1544  Place in Observation  Once                      Orders Placed This Encounter   Procedures   • Inpatient Admission     Standing Status:   Standing     Number of Occurrences:   1     Order Specific Question:   Level of Care     Answer:   Med Surg [16]     Order Specific Question:   Estimated length of stay     Answer:   More than 2 Midnights     Order Specific Question:   Certification     Answer:   I certify that inpatient services are medically necessary for this patient for a duration of greater than two midnights. See H&P and MD Progress Notes for additional information about the patient's course of treatment. ED Arrival Information     Expected   -    Arrival   8/26/2023 11:39    Acuity   Urgent            Means of arrival   Wheelchair    Escorted by   Family Member    Service   Hospitalist    Admission type   Emergency            Arrival complaint   lightheaded, dizziness           Chief Complaint   Patient presents with   • Dizziness     States was unable to get out of bed for 2 days. States very dizzy and gait is unsteady. No trauma. Started on solumedrol dosepak for poison ivy 2 days prior to dizziness. No extremity weakness. Has Nausea and vomiting        Initial Presentation:   68 y.o. female with no known medical history, no routine medical follow-up who presents with symptoms of unsteadiness and difficulty in ambulation since last Wednesday. Patient reported that earlier in the week , she had developed pruritic rash of her face and body for which she was evaluated at urgent care center and was diagnosed to have poison ivy and was given Medrol Dosepak.   Next day on Wednesday, she felt a little dizzy which she described as feeling of unsteadiness. Next 2 days her symptoms progressed to the extent that she was feeling unsteady that she was going to fall every time she is attempted to stand up. This was associated with nonbloody nonbilious vomiting. Patient reported that she was able to walk but she had to hold onto support for ambulation. Due to her symptoms she has not been able to eat or move around over the last 2  days and then she presented to ED for further evaluation. Hx HTN, cardiac disease per family. Presents bradycardic, s/s as above. Admission Lab revealed hemoconcentration, hypokalemia, positive ketones in urine. Placed in observation status for dizziness, neuro consulted MRI ordered. 8/27/23- observation:  Remains unsteady with ambulation, but improving, continue Meclizine q8h. Telemetry- sinus rhythm and sinus bradycardia. No evidence of chronotropic incompetence. Monitor VS, neuro checks. Date: 8/28/23    Day 3: Has surpassed a 2nd midnight with active treatments and services, which include neuro assessment, VS monitor labs/lytes. Meclizine q8h, IVF maintained.  Scheduled for MRI, echo    ED Triage Vitals [08/26/23 1149]   Temperature Pulse Respirations Blood Pressure SpO2   97.5 °F (36.4 °C) (!) 52 20 162/77 100 %      Temp Source Heart Rate Source Patient Position - Orthostatic VS BP Location FiO2 (%)   Tympanic Monitor Sitting Left arm --      Pain Score       No Pain          Wt Readings from Last 1 Encounters:   08/26/23 59 kg (130 lb)     Additional Vital Signs:   Date/Time Temp Pulse Resp BP MAP (mmHg) SpO2 O2 Device Patient Position - Orthostatic VS   08/27/23 0357 -- 91 -- 134/65 88 97 % -- --   08/27/23 0257 -- 48 Abnormal  -- 129/68 88 94 % -- --   08/27/23 02:17:39 97.8 °F (36.6 °C) 54 Abnormal  16 138/80 99 91 % -- --   08/27/23 0200 -- 46 Abnormal  -- 149/72 98 96 % -- --   08/27/23 0056 -- 60 -- 140/63 89 98 % -- --   08/27/23 0000 -- 51 Abnormal  -- 140/63 89 95 % -- -- 08/26/23 22:52:51 98.5 °F (36.9 °C) 70 16 -- -- 94 % -- --   08/26/23 22:45:37 -- 70 -- 151/72 98 93 % -- --   08/26/23 2200 -- 54 Abnormal  -- 174/81 Abnormal  112 97 % -- --   08/26/23 2054 -- 50 Abnormal  -- 171/83 Abnormal  112 96 % -- --   08/26/23 19:48:50 99.2 °F (37.3 °C) 48 Abnormal  18 178/74 Abnormal  109 96 % -- --   08/26/23 1831 98.4 °F (36.9 °C) 54 Abnormal  18 177/70 Abnormal  -- 97 % None (Room air) Sitting   08/26/23 1722 -- -- -- 177/76 Abnormal  -- -- -- --   08/26/23 1715 98.7 °F (37.1 °C) 48 Abnormal  14 177/76 Abnormal  -- 98 % None (Room air) --   08/26/23 1534 -- -- -- 166/89 -- 99 % None (Room air) Standing - Orthostatic VS   08/26/23 1532 -- 54 Abnormal  -- 175/80 Abnormal  -- -- -- Sitting - Orthostatic VS   08/26/23 1526 -- 54 Abnormal  -- 187/85 Abnormal  -- -- -- Sitting - Orthostatic VS   08/26/23 1524 -- 60 20 151/71 -- 98 % -- Lying - Orthostatic VS     Pertinent Labs/Diagnostic Test Results:   8/27 Echo=  Result pending    CTA head and neck w wo contrast   Final Result  (08/26 2138)      1. No acute intracranial hemorrhage, mass effect or extra-axial collection. 2.  No hemodynamically significant stenosis, dissection or occlusion of the carotid or vertebral arteries. 3.  No intracranial aneurysm. No hemodynamically significant stenosis or occlusion of the major vessels of the [place]. CT head without contrast   Final Result  (08/26 7415)      No acute intracranial abnormality.       MRI brain wo contrast    (Results Pending)     8/26 Ekg=  Interpretation: abnormal    Rate:     ECG rate:  49    ECG rate assessment: bradycardic    Rhythm:     Rhythm: sinus rhythm    Ectopy:     Ectopy: none    ST segments:     ST segments:  Normal  T waves:     T waves: non-specific    Other findings:     Other findings: LAE and LVH      Results from last 7 days   Lab Units 08/27/23  0959 08/26/23  1219   WBC Thousand/uL 6.77 6.15   HEMOGLOBIN g/dL 15.8* 16.2*   HEMATOCRIT % 46.4* 48.8*   PLATELETS Thousands/uL 198 237   NEUTROS ABS Thousands/µL  --  3.80     Results from last 7 days   Lab Units 08/27/23  0959 08/26/23  1219   SODIUM mmol/L 137 138   POTASSIUM mmol/L 3.3* 3.4*   CHLORIDE mmol/L 102 99   CO2 mmol/L 27 28   ANION GAP mmol/L 8 11   BUN mg/dL 11 15   CREATININE mg/dL 0.69 0.72   EGFR ml/min/1.73sq m 86 83   CALCIUM mg/dL 8.7 9.4   MAGNESIUM mg/dL 1.9  --      Results from last 7 days   Lab Units 08/27/23  0959 08/26/23  1219   AST U/L 12* 11*   ALT U/L 13 12   ALK PHOS U/L 67 77   TOTAL PROTEIN g/dL 6.8 7.5   ALBUMIN g/dL 3.6 4.1   TOTAL BILIRUBIN mg/dL 0.90 1.07*     Results from last 7 days   Lab Units 08/27/23  0959 08/26/23  1219   GLUCOSE RANDOM mg/dL 117 80     Results from last 7 days   Lab Units 08/27/23  0959   HEMOGLOBIN A1C % 5.6   EAG mg/dl 114     Results from last 7 days   Lab Units 08/26/23  1443 08/26/23  1219   HS TNI 0HR ng/L  --  7   HS TNI 2HR ng/L 8  --    HSTNI D2 ng/L 1  --      Results from last 7 days   Lab Units 08/27/23  0959   TSH 3RD GENERATON uIU/mL 4.139     Results from last 7 days   Lab Units 08/26/23  1219   LIPASE u/L 43     Results from last 7 days   Lab Units 08/27/23  1011 08/26/23  1400   CLARITY UA  Clear Clear   COLOR UA  Light Yellow Light Yellow   SPEC GRAV UA  <=1.005 1.025   PH UA  6.5 6.5   GLUCOSE UA mg/dl Negative Negative   KETONES UA mg/dl Negative 40 (2+)*   BLOOD UA  Negative Trace-Intact*   PROTEIN UA mg/dl Negative Negative   NITRITE UA  Negative Negative   BILIRUBIN UA  Negative Small*   UROBILINOGEN UA E.U./dl 1.0 1.0   LEUKOCYTES UA  Trace* Moderate*   WBC UA /hpf 1-2* 10-20*   RBC UA /hpf None Seen 0-1   BACTERIA UA /hpf Occasional Occasional   EPITHELIAL CELLS WET PREP /hpf Occasional Occasional   MUCUS THREADS   --  Occasional*     ED Treatment:   Medication Administration from 08/26/2023 1139 to 08/26/2023 1825       Date/Time Order Dose Route Action     08/26/2023 1222 EDT sodium chloride 0.9 % bolus 1,000 mL 1,000 mL Intravenous New Bag        History reviewed. No pertinent past medical history. Present on Admission:  • Lightheadedness  • Hypokalemia  • Elevated BP without diagnosis of hypertension  • Bradycardia  • Rash      Admitting Diagnosis: Dizziness [R42]  Lightheadedness [R42]  Age/Sex: 68 y.o. female  Admission Orders:  Orthostatic VS  Nursing dysphagia screen  Pt/ot/st eval & tx  Neuro checks:Every 1 hour x 4 hours, then every 2 hours x 8 hours, then every 4 hours x 72 hours  Scd/foot pumps  Consult neuro  Telemetry    Scheduled Medications:  Medications 08/19 08/20 08/21 08/22 08/23 08/24 08/25 08/26 08/27 08/28   aspirin chewable tablet 81 mg  Dose: 81 mg  Freq: Daily Route: PO  Start: 08/26/23 1915 2121      0854      0805        atorvastatin (LIPITOR) tablet 40 mg  Dose: 40 mg  Freq: Every evening Route: PO  Start: 08/26/23 1915 2121      (6107)      1800        enoxaparin (LOVENOX) subcutaneous injection 40 mg  Dose: 40 mg  Freq: Daily Route: SC  Start: 08/27/23 0900   Admin Instructions:   High Alert Medication, Check for allergies to pork or pork derivatives/dietary restrictions before administration. LOOK ALIKE SOUND ALIKE [facility]            (0830)      (8761)        enoxaparin (LOVENOX) subcutaneous injection 40 mg  Dose: 40 mg  Freq: Daily Route: SC  Start: 08/27/23 0900 End: 08/26/23 1909   Admin Instructions:   High Alert Medication, Check for allergies to pork or pork derivatives/dietary restrictions before administration. LOOK ALIKE SOUND ALIKE MED           1909-D/C'd        meclizine (ANTIVERT) tablet 25 mg  Dose: 25 mg  Freq: Every 8 hours scheduled Route: PO  Start: 08/26/23 1915 2121      0542     1302     2109      0501     1400     2200        potassium chloride (K-DUR,KLOR-CON) CR tablet 20 mEq  Dose: 20 mEq  Freq: 3 times daily with meals Route: PO  Start: 08/27/23 1200 End: 08/27/23 1603   Admin Instructions:   Swallow whole; do not crush or chew.  Tablet may be split in half to facilitate swallowing. 1253     1603         potassium chloride (K-DUR,KLOR-CON) CR tablet 20 mEq  Dose: 20 mEq  Freq: Once Route: PO  Start: 08/26/23 1930 End: 08/26/23 2121   Admin Instructions:   Swallow whole; do not crush or chew. Tablet may be split in half to facilitate swallowing.           2121          sodium chloride 0.9 % bolus 1,000 mL  Dose: 1,000 mL  Freq: Once Route: IV  Last Dose: Stopped (08/26/23 1322)  Start: 08/26/23 1215 End: 08/26/23 1322           1222     1322          Medications 08/19 08/20 08/21 08/22 08/23 08/24 08/25 08/26 08/27 08/28         Continuous Meds Sorted by Name  for [place] as of 08/28/23 0850  Legend:                          Inactive     Active     Other Encounter     Linked                 Medications 08/19 08/20 08/21 08/22 08/23 08/24 08/25 08/26 08/27 08/28   lactated ringers infusion  Rate: 125 mL/hr Dose: 125 mL/hr  Freq: Continuous Route: IV  Last Dose: 125 mL/hr (08/28/23 0803)  Start: 08/26/23 1915           2130      1730     1632     2359      0803              PRN Meds Sorted by Name  for [place] as of 08/28/23 0850  Legend:                          Inactive     Active     Other Encounter     Linked                 Medications 08/19 08/20 08/21 08/22 08/23 08/24 08/25 08/26 08/27 08/28   acetaminophen (TYLENOL) tablet 650 mg  Dose: 650 mg  Freq: Every 4 hours PRN Route: PO  PRN Reason: mild pain  Indications of Use: FEVER,HEADACHE,MILD PAIN  Start: 08/26/23 1907                acetaminophen (TYLENOL) tablet 650 mg  Dose: 650 mg  Freq: Every 6 hours PRN Route: PO  PRN Reasons: mild pain,fever,headaches  Indications of Use: FEVER,HEADACHE,MILD PAIN  Start: 08/26/23 1826 End: 08/26/23 1909 1909-D/C'd        iohexol (OMNIPAQUE) 350 MG/ML injection (SINGLE-DOSE) 100 mL  Dose: 100 mL  Freq:  Once in imaging Route: IV  PRN Reason: contrast  Start: 08/26/23 2029 End: 08/26/23 2029 2029 LORazepam (ATIVAN) tablet 1 mg  Dose: 1 mg  Freq: Once in imaging Route: PO  PRN Reason: anxiety  Start: 08/28/23 0752   Admin Instructions:   High alert medication. LOOK ALIKE SOUND ALIKE MED                ondansetron (ZOFRAN) injection 4 mg  Dose: 4 mg  Freq: Every 6 hours PRN Route: IV  PRN Reasons: nausea,vomiting  Start: 08/26/23 1907   Admin Instructions:   Push over 2 minutes. Network Utilization Review Department  ATTENTION: Please call with any questions or concerns to [phone] and carefully listen to the prompts so that you are directed to the right person. All voicemails are confidential.  Heather Vasquez all requests for admission clinical reviews, approved or denied determinations and any other requests to dedicated fax number below belonging to the campus where the patient is receiving treatment.  List of dedicated fax numbers for the Facilities:  [place] DENIALS (Administrative/Medical Necessity) [phone] [address] [facility] (Maternity/NICU/Pediatrics) [phone]   [address] [place] [address] Drive [phone]   [facility] [address] [facility] [phone]   15[address] [facility] [address] [facility] [address] [facility] [address] [place] [address] Street [address] [facility] [phone]   [address] [facility] [address] [facility]  [facility] [phone]

## 2023-08-28 ENCOUNTER — APPOINTMENT (INPATIENT)
Dept: NON INVASIVE DIAGNOSTICS | Facility: HOSPITAL | Age: 73
DRG: 149 | End: 2023-08-28
Payer: COMMERCIAL

## 2023-08-28 ENCOUNTER — APPOINTMENT (INPATIENT)
Dept: RADIOLOGY | Facility: HOSPITAL | Age: 73
DRG: 149 | End: 2023-08-28
Payer: COMMERCIAL

## 2023-08-28 VITALS
HEART RATE: 67 BPM | DIASTOLIC BLOOD PRESSURE: 86 MMHG | WEIGHT: 130 LBS | OXYGEN SATURATION: 98 % | RESPIRATION RATE: 20 BRPM | BODY MASS INDEX: 28.05 KG/M2 | TEMPERATURE: 97.6 F | HEIGHT: 57 IN | SYSTOLIC BLOOD PRESSURE: 168 MMHG

## 2023-08-28 PROBLEM — E87.6 HYPOKALEMIA: Status: RESOLVED | Noted: 2023-08-26 | Resolved: 2023-08-28

## 2023-08-28 PROBLEM — R21 RASH: Status: RESOLVED | Noted: 2023-08-26 | Resolved: 2023-08-28

## 2023-08-28 PROBLEM — R42 LIGHTHEADEDNESS: Status: RESOLVED | Noted: 2023-08-26 | Resolved: 2023-08-28

## 2023-08-28 LAB
AORTIC ROOT: 3.5 CM
APICAL FOUR CHAMBER EJECTION FRACTION: 49 %
AV LVOT PEAK GRADIENT: 6 MMHG
AV PEAK GRADIENT: 10 MMHG
DOP CALC LVOT AREA: 3.14 CM2
DOP CALC LVOT DIAMETER: 2 CM
E WAVE DECELERATION TIME: 234 MS
FRACTIONAL SHORTENING: 31 (ref 28–44)
INTERVENTRICULAR SEPTUM IN DIASTOLE (PARASTERNAL SHORT AXIS VIEW): 1.4 CM
INTERVENTRICULAR SEPTUM: 1.4 CM (ref 0.6–1.1)
LAAS-AP2: 20.4 CM2
LAAS-AP4: 19.5 CM2
LEFT ATRIUM AREA SYSTOLE SINGLE PLANE A4C: 20 CM2
LEFT ATRIUM SIZE: 4 CM
LEFT ATRIUM VOLUME (MOD BIPLANE): 56 ML
LEFT INTERNAL DIMENSION IN SYSTOLE: 2.2 CM (ref 2.1–4)
LEFT VENTRICLE DIASTOLIC VOLUME (MOD BIPLANE): 85 ML
LEFT VENTRICLE SYSTOLIC VOLUME (MOD BIPLANE): 45 ML
LEFT VENTRICULAR INTERNAL DIMENSION IN DIASTOLE: 3.2 CM (ref 3.5–6)
LEFT VENTRICULAR POSTERIOR WALL IN END DIASTOLE: 1.4 CM
LEFT VENTRICULAR STROKE VOLUME: 23 ML
LV EF: 47 %
LVSV (TEICH): 23 ML
MV E'TISSUE VEL-LAT: 6 CM/S
MV E'TISSUE VEL-SEP: 8 CM/S
MV PEAK A VEL: 0.92 M/S
MV PEAK E VEL: 64 CM/S
MV STENOSIS PRESSURE HALF TIME: 68 MS
MV VALVE AREA P 1/2 METHOD: 3.24
PV PEAK GRADIENT: 6 MMHG
RA PRESSURE ESTIMATED: 8 MMHG
RIGHT ATRIUM AREA SYSTOLE A4C: 10.7 CM2
RIGHT VENTRICLE ID DIMENSION: 2.8 CM
RV PSP: 32 MMHG
SL CV LEFT ATRIUM LENGTH A2C: 5.7 CM
SL CV LV EF: 50
SL CV PED ECHO LEFT VENTRICLE DIASTOLIC VOLUME (MOD BIPLANE) 2D: 40 ML
SL CV PED ECHO LEFT VENTRICLE SYSTOLIC VOLUME (MOD BIPLANE) 2D: 17 ML
TR MAX PG: 24 MMHG
TR PEAK VELOCITY: 2.5 M/S
TRICUSPID ANNULAR PLANE SYSTOLIC EXCURSION: 1.9 CM
TRICUSPID VALVE PEAK REGURGITATION VELOCITY: 2.46 M/S

## 2023-08-28 PROCEDURE — 70551 MRI BRAIN STEM W/O DYE: CPT

## 2023-08-28 PROCEDURE — 93306 TTE W/DOPPLER COMPLETE: CPT

## 2023-08-28 PROCEDURE — 99239 HOSP IP/OBS DSCHRG MGMT >30: CPT | Performed by: INTERNAL MEDICINE

## 2023-08-28 PROCEDURE — 93306 TTE W/DOPPLER COMPLETE: CPT | Performed by: INTERNAL MEDICINE

## 2023-08-28 PROCEDURE — 99222 1ST HOSP IP/OBS MODERATE 55: CPT | Performed by: PSYCHIATRY & NEUROLOGY

## 2023-08-28 RX ORDER — MECLIZINE HYDROCHLORIDE 25 MG/1
12.5 TABLET ORAL EVERY 8 HOURS SCHEDULED
Qty: 30 TABLET | Refills: 0 | Status: SHIPPED | OUTPATIENT
Start: 2023-08-28

## 2023-08-28 RX ORDER — LORAZEPAM 1 MG/1
1 TABLET ORAL
Status: COMPLETED | OUTPATIENT
Start: 2023-08-28 | End: 2023-08-28

## 2023-08-28 RX ADMIN — MECLIZINE HYDROCHLORIDE 25 MG: 25 TABLET ORAL at 05:01

## 2023-08-28 RX ADMIN — LORAZEPAM 1 MG: 1 TABLET ORAL at 09:32

## 2023-08-28 RX ADMIN — SODIUM CHLORIDE, SODIUM LACTATE, POTASSIUM CHLORIDE, AND CALCIUM CHLORIDE 125 ML/HR: .6; .31; .03; .02 INJECTION, SOLUTION INTRAVENOUS at 08:03

## 2023-08-28 RX ADMIN — ASPIRIN 81 MG CHEWABLE TABLET 81 MG: 81 TABLET CHEWABLE at 08:05

## 2023-08-28 NOTE — ASSESSMENT & PLAN NOTE
- No known diagnosis of HTN but patient does not regularly follow-up for medical care.    - Recommend goal of normotension.   - Management as per medicine team.

## 2023-08-28 NOTE — CASE MANAGEMENT
Case Management Assessment & Discharge Planning Note    Patient name Arthur Araujo  Location [address] [facility]-* MRN [id number]  : 1950 Date 2023       Current Admission Date: 2023  Current Admission Diagnosis:Lightheadedness   Patient Active Problem List    Diagnosis Date Noted   • Lightheadedness 2023   • Hypokalemia 2023   • Elevated BP without diagnosis of hypertension 2023   • Bradycardia 2023   • Rash 2023      LOS (days): 0  Geometric Mean LOS (GMLOS) (days):   Days to GMLOS:     OBJECTIVE:    Risk of Unplanned Readmission Score: 7.16       Current admission status: Inpatient  Referral Reason: Stroke, Other (Discharge planning)    Preferred Pharmacy:   [facility] [address] [place] [address]  Phone: [phone] Fax: [phone]    Primary Care Provider: No primary care provider on file. Primary Insurance: [employer] [facility] HOSPITAL REP  Secondary Insurance:     ASSESSMENT:  [employer] Proxies    There are no active Health Care Proxies on file. Readmission Root Cause  30 Day Readmission: No    Patient Information  Admitted from[de-identified] Home  Mental Status: Alert  During Assessment patient was accompanied by: Daughter  Assessment information provided by[de-identified] Patient  Primary Caregiver: Self  Caregiver's Name[de-identified] Hamm Cleveland (daughter)  Caregiver's Relationship to Patient[de-identified] Family Member  Caregiver's Telephone Number[de-identified] [phone]  Support Systems: Spouse/significant other, Daughter, Children, [address] of Residence: Other (specify in comment box) [facility] do you live in?: [place]  Type of Current Residence: 2 story home  Upon entering residence, is there a bedroom on the main floor (no further steps)?: No  A bedroom is located on the following floor levels of residence (select all that apply):: 2nd Floor  Number of steps to 2nd floor from main floor:  One Flight  In the last 12 months, was there a time when you were not able to pay the mortgage or rent on time?: No  In the last 12 months, was there a time when you did not have a steady place to sleep or slept in a shelter (including now)?: No  Homeless/housing insecurity resource given?: N/A  Living Arrangements: Lives w/ Spouse/significant other    Activities of Daily Living Prior to Admission  Functional Status: Independent  Completes ADLs independently?: Yes  Ambulates independently?: Yes  Does patient use assisted devices?: No  Does patient currently own DME?: No  Does patient currently have [address]?: No    Patient Information Continued  Income Source: Pension/group home  Does patient have prescription coverage?: Yes  Within the past 12 months, you worried that your food would run out before you got the money to buy more.: Never true  Within the past 12 months, the food you bought just didn't last and you didn't have money to get more.: Never true  Food insecurity resource given?: N/A  Does patient receive dialysis treatments?: No    Means of Transportation  Means of Transport to Appts[de-identified] Family transport  In the past 12 months, has lack of transportation kept you from medical appointments or from getting medications?: No  In the past 12 months, has lack of transportation kept you from meetings, work, or from getting things needed for daily living?: No  Was application for public transport provided?: N/A      DISCHARGE DETAILS:    Discharge planning discussed with[de-identified] Patient and daughter  Freedom of Choice: Yes     Comments - Freedom of Choice: SW spoke with patient and daughter at bedside to introduce role of CM, conduct assessment and discuss discharge planning. Patient lives with her  and is independent at baseline. Family provides transportation. Her preference is to return home at discharge and family will transport. Patient has been assessed as having no rehabilitation needs.       Patient does not have a PCP and SW provided a list of PCP providers in the [address] [place] [address] area as listed by her insurance company. SW will continue to follow. CM contacted family/caregiver?: Yes  Were Treatment Team discharge recommendations reviewed with patient/caregiver?: Yes  Did patient/caregiver verbalize understanding of patient care needs?: Yes  Were patient/caregiver advised of the risks associated with not following Treatment Team discharge recommendations?: Yes    Contacts  Patient Contacts: Gurwinder Naidu (daughter)  Relationship to Patient[de-identified] Family  Contact Method:  In Person  Reason/Outcome: Discharge Planning, Emergency [address] [facility]         Is the patient interested in [address] at discharge?: No    DME Referral Provided  Referral made for DME?: No    Other Referral/Resources/Interventions Provided:  Interventions: PCP      Treatment Team Recommendation: Home  Discharge Destination Plan[de-identified] Home  Transport at Discharge : Family         IMM Given (Date):: 08/28/23

## 2023-08-28 NOTE — ASSESSMENT & PLAN NOTE
68year old female with no known past medical history who presented to the hospital with complaint of lightheadedness and associated ambulatory dysfunction. Patient notes that this started after she started taking Medrol Dosepak for rash. Patient presented to the ED and was noted to be hypertensive and also with bradycardia. She was admitted for further work-up. Etiology of symptoms is unclear at this time, differential includes medication reaction vs related to hypertension/bradycardia vs peripheral vertigo but cannot entirely exclude ischemic stroke. Plan:   - Recommend continue on stroke pathway. - MRI brain without contrast pending.   - Echocardiogram pending.   - Continue to monitor on telemetry. - Lipid panel reviewed, total cholesterol 142, triglycerides 61, HDL 46, LDL 84.   - Hemoglobin A1c reviewed, 5.6.   - Continue on ASA 81 mg QD and atorvastatin 40 mg QPM.   - Goal normotension, normothermia, euglycemia.   - PT/OT   - Stroke education.   - Monitor exam and notify with changes.

## 2023-08-28 NOTE — PLAN OF CARE
Problem: Potential for Falls  Goal: Patient will remain free of falls  Description: INTERVENTIONS:  - Educate patient/family on patient safety including physical limitations  - Instruct patient to call for assistance with activity   - Consult OT/PT to assist with strengthening/mobility   - Keep Call bell within reach  - Keep bed low and locked with side rails adjusted as appropriate  - Keep care items and personal belongings within reach  - Initiate and maintain comfort rounds  - Make Fall Risk Sign visible to staff  - Offer Toileting every [id number], in advance of need  - Initiate/Maintain bed alarm  - Apply yellow socks and bracelet for high fall risk patients  - Consider moving patient to room near nurses station  8/28/2023 0858 by Lorri Kaur RN  Outcome: Progressing  8/28/2023 0857 by Lorri Kaur RN  Outcome: Progressing     Problem: PAIN - ADULT  Goal: Verbalizes/displays adequate comfort level or baseline comfort level  Description: Interventions:  - Encourage patient to monitor pain and request assistance  - Assess pain using appropriate pain scale  - Administer analgesics based on type and severity of pain and evaluate response  - Implement non-pharmacological measures as appropriate and evaluate response  - Consider cultural and social influences on pain and pain management  - Notify physician/advanced practitioner if interventions unsuccessful or patient reports new pain  8/28/2023 0858 by Lorri Kaur RN  Outcome: Progressing  8/28/2023 0857 by Lorri Kaur RN  Outcome: Progressing     Problem: SAFETY ADULT  Goal: Patient will remain free of falls  Description: INTERVENTIONS:  - Educate patient/family on patient safety including physical limitations  - Instruct patient to call for assistance with activity   - Consult OT/PT to assist with strengthening/mobility   - Keep Call bell within reach  - Keep bed low and locked with side rails adjusted as appropriate  - Keep care items and personal belongings within reach  - Initiate and maintain comfort rounds  - Make Fall Risk Sign visible to staff  - Offer Toileting every [id number], in advance of need  - Initiate/Maintain bed alarm  - Apply yellow socks and bracelet for high fall risk patients  - Consider moving patient to room near nurses station  8/28/2023 0858 by Sam Finn RN  Outcome: Progressing  8/28/2023 0857 by Sam Finn RN  Outcome: Progressing     Problem: DISCHARGE PLANNING  Goal: Discharge to home or other facility with appropriate resources  Description: INTERVENTIONS:  - Identify barriers to discharge w/patient and caregiver  - Arrange for needed discharge resources and transportation as appropriate  - Identify discharge learning needs (meds, wound care, etc.)  - Arrange for interpretive services to assist at discharge as needed  - Refer to Case Management Department for coordinating discharge planning if the patient needs post-hospital services based on physician/advanced practitioner order or complex needs related to functional status, cognitive ability, or social support system  8/28/2023 0858 by Sam Finn RN  Outcome: Progressing  8/28/2023 0857 by Sam Finn RN  Outcome: Progressing     Problem: Knowledge Deficit  Goal: Patient/family/caregiver demonstrates understanding of disease process, treatment plan, medications, and discharge instructions  Description: Complete learning assessment and assess knowledge base.   Interventions:  - Provide teaching at level of understanding  - Provide teaching via preferred learning methods  8/28/2023 0858 by Sam Finn RN  Outcome: Progressing  8/28/2023 0857 by Sam Finn RN  Outcome: Progressing     Problem: NEUROSENSORY - ADULT  Goal: Achieves stable or improved neurological status  Description: INTERVENTIONS  - Monitor and report changes in neurological status  - Monitor vital signs such as temperature, blood pressure, glucose, and any other labs ordered   - Initiate measures to prevent increased intracranial pressure  - Monitor for seizure activity and implement precautions if appropriate      8/28/2023 0858 by Mehreen Strange RN  Outcome: Progressing  8/28/2023 0857 by Mehreen Strange RN  Outcome: Progressing     Problem: CARDIOVASCULAR - ADULT  Goal: Absence of cardiac dysrhythmias or at baseline rhythm  Description: INTERVENTIONS:  - Continuous cardiac monitoring, vital signs, obtain 12 lead EKG if ordered  - Administer antiarrhythmic and heart rate control medications as ordered  - Monitor electrolytes and administer replacement therapy as ordered  8/28/2023 0858 by Mehreen Strange RN  Outcome: Progressing  8/28/2023 0857 by Mehreen Strange RN  Outcome: Progressing     Problem: MOBILITY - ADULT  Goal: Maintain or return to baseline ADL function  Description: INTERVENTIONS:  -  Assess patient's ability to carry out ADLs; assess patient's baseline for ADL function and identify physical deficits which impact ability to perform ADLs (bathing, care of mouth/teeth, toileting, grooming, dressing, etc.)  - Assess/evaluate cause of self-care deficits   - Assess range of motion  - Assess patient's mobility; develop plan if impaired  - Assess patient's need for assistive devices and provide as appropriate  - Encourage maximum independence but intervene and supervise when necessary  - Involve family in performance of ADLs  - Assess for home care needs following discharge   - Consider OT consult to assist with ADL evaluation and planning for discharge  - Provide patient education as appropriate  8/28/2023 0858 by Mehreen Strange RN  Outcome: Progressing  8/28/2023 0857 by Mehreen Strange RN  Outcome: Progressing     Problem: MOBILITY - ADULT  Goal: Maintains/Returns to pre admission functional level  Description: INTERVENTIONS:  - Perform BMAT or MOVE assessment daily.   - Set and communicate daily mobility goal to care team and patient/family/caregiver.    - Collaborate with rehabilitation services on mobility goals if consulted  - Perform Range of Motion 3 times a day. - Reposition patient every 2 hours.   - Dangle patient 3 times a day  - Stand patient 3 times a day  - Ambulate patient 3 times a day  - Out of bed to chair 3 times a day   - Out of bed for meals 3 times a day  - Out of bed for toileting  - Record patient progress and toleration of activity level   8/28/2023 0858 by Lorri Kaur RN  Outcome: Progressing  8/28/2023 0857 by Lorri Kaur RN  Outcome: Progressing

## 2023-08-28 NOTE — SPEECH THERAPY NOTE
Consult received. Records reviewed. Pt admitted c vertigo (DDX: medication reaction vs related to hypertension/bradycardia vs peripheral vertigo, r/o CVA/TIA. Pt passed RN Dysphagia Assessment. Communication deficits denied and no s/s noted. MRI results pending. No additional inpatient Speech Pathology evaluation appears indicated at this time. Please re-consult if additional concerns arise. Thank you.   Tano Sanchez [address] [place], [place] [id number]

## 2023-08-28 NOTE — DISCHARGE SUMMARY
Discharge Summary - [facility] Internal Medicine    Patient Information: Gracy Mcmanus 68 y.o. female MRN: [id number]  Unit/Bed#: 913 [facility] 404-01 Encounter: [id number]    Discharging Physician / Practitioner: Jami Salguero MD  PCP: No primary care provider on file. Admission Date: 8/26/2023  Discharge Date: 08/28/23    Reason for Admission: Dizziness (States was unable to get out of bed for 2 days. States very dizzy and gait is unsteady. No trauma. Started on solumedrol dosepak for poison ivy 2 days prior to dizziness. No extremity weakness. Has Nausea and vomiting )      Discharge Diagnoses:     Principal Problem (Resolved):    Lightheadedness  Active Problems:    Elevated BP without diagnosis of hypertension    Bradycardia  Resolved Problems:    Hypokalemia    Rash        * Lightheadedness-resolved as of 8/28/2023  Assessment & Plan  Presented with symptoms of unsteadiness, imbalance since 8/23 associated with nausea and vomiting. Patient was unable to get up from bed for 2 days as she was afraid of falling when she attempted to stand up. CT head without any acute abnormality. Neuro exam appears nonfocal  Lab revealed hemoconcentration, hypokalemia, positive ketones in urine due to dehydration and decreased p.o. intake  CTA head and neck without any acute abnormality  Suspect vertigo, likely peripheral rule out other etiologies  Hematemesis resolved. MRI brain without any acute abnormality   able to ambulate independently  No further GI symptoms  · Telemetry- no advanced arrhythmia  · Neuro checks-remained stable  · LDL 84, hemoglobin A1c 5.6  · Monitor p.o. intake  · Recommend to monitor symptoms  · Symptomatic treatment  · Follow-up with PCP  · PT/OT/ST- remains independent      Bradycardia  Assessment & Plan  Noted on presentation, blood pressure elevated. Patient denies any postural lightheadedness  · Telemetry- sinus rhythm and sinus bradycardia.   No evidence of chronotropic incompetence  · TSH-normal  · Monitor    Elevated BP without diagnosis of hypertension  Assessment & Plan  Noted to elevated blood pressure on presentation, no prior history as patient does not have any routine medical follow-up  Blood pressure trend noted  2D echo with EF 50%, concentric hypertrophy, grade 1 diastolic dysfunction. · Advised blood pressure monitoring with consideration of addition of antihypertensive medication  · Patient would like to pursue diet lifestyle modification. · agrees to follow-up with PCP for monitoring  · Also advised cardiology follow-up    Rash-resolved as of 8/28/2023  Assessment & Plan  Developed after working in yard cutting bushes. Developed rash involving face, seen in urgent care center on 8/21, prescribed Medrol Dosepak  Rash improved after 2 days of Medrol Dosepak  Denies any symptoms at present, rash appears to have almost resolved  Monitor    Hypokalemia-resolved as of 8/28/2023  Assessment & Plan  secondary to decreased p.o. intake  Replete and monitor        Consultations During Hospital Stay:   [address] TO CASE MANAGEMENT  IP CONSULT TO NUTRITION SERVICES    Procedures Performed:     · Echocardiogram- EF 50%, severe concentric hypertrophy, grade 1 diastolic dysfunction. Significant Findings:     · Refer to hospital course and above listed diagnosis related plan for details    Imaging while in hospital:    Echo complete w/ contrast if indicated    Result Date: 8/28/2023  Narrative: •  Left Ventricle: Left ventricular cavity size is normal. Wall thickness is severely increased. There is severe concentric hypertrophy. RWT of 0.88. LVMI of 102 g/m2. The left ventricular ejection fraction is 50% by visual estimation. . Systolic function is low normal. Wall motion is normal. Diastolic function is mildly abnormal, consistent with grade I (abnormal) relaxation.   Left atrial filling pressure is normal. •  Left Atrium: The atrium is mildly dilated (35-41 mL/m2). •  Atrial Septum: No patent foramen ovale detected using saline contrast injection with provocation by Valsalva. •  Tricuspid Valve: There is mild regurgitation. The right ventricular systolic pressure is normal. The estimated right ventricular systolic pressure is 95.57 mmHg. •  Pulmonic Valve: There is mild regurgitation. MRI brain wo contrast    Result Date: 8/28/2023  Narrative: MRI BRAIN WITHOUT CONTRAST INDICATION: Stroke. Dizziness, unsteadiness, uncontrolled hypertension COMPARISON:   CTA head and neck with and without contrast + CT head without contrast 8/26/2023. TECHNIQUE:  Multiplanar, multisequence imaging of the brain was performed. IMAGE QUALITY:  Diagnostic. FINDINGS: BRAIN PARENCHYMA:  There is no discrete mass, mass effect or midline shift. There is no intracranial hemorrhage. There is no evidence of acute infarction and diffusion imaging is unremarkable. Small scattered hyperintensities on T2/FLAIR imaging are noted in the periventricular and subcortical white matter demonstrating an appearance that is statistically most likely to represent mild microangiopathic change. VENTRICLES:  Normal for the patient's age. SELLA AND PITUITARY GLAND:  Normal. ORBITS:  Normal. PARANASAL SINUSES:  Normal. VASCULATURE:  Evaluation of the major intracranial vasculature demonstrates appropriate flow voids. CALVARIUM AND SKULL BASE:  Normal. EXTRACRANIAL SOFT TISSUES:  Normal.     Impression: No acute intracranial abnormality. Mild chronic microangiopathy. Workstation performed: [id number]     CTA head and neck w wo contrast    Result Date: 8/26/2023  Narrative: CTA NECK AND BRAIN WITH AND WITHOUT CONTRAST INDICATION: Vertigo, central Dizziness, unsteadiness COMPARISON:   None. TECHNIQUE:  Routine CT imaging of the Brain without contrast.  Post contrast imaging was performed after administration of iodinated contrast through the neck and brain.  Post contrast axial 0.625 mm images timed to opacify the arterial system. 3D rendering was performed on an independent workstation. MIP reconstructions performed. Coronal reconstructions were performed of the noncontrast portion of the brain. Radiation dose length product (DLP) for this visit:  1310 mGy-cm . This examination, like all CT scans performed in the [facility], was performed utilizing techniques to minimize radiation dose exposure, including the use of iterative reconstruction and automated exposure control. IV Contrast:  85 mL of iohexol (OMNIPAQUE) IMAGE QUALITY:   Diagnostic FINDINGS: NONCONTRAST BRAIN PARENCHYMA:  No intracranial mass, mass effect or midline shift. No CT signs of acute infarction. No acute parenchymal hemorrhage. VENTRICLES AND EXTRA-AXIAL SPACES:  Normal for the patient's age. VISUALIZED ORBITS: Normal visualized orbits. PARANASAL SINUSES: Normal visualized paranasal sinuses. CERVICAL VASCULATURE AORTIC ARCH AND GREAT VESSELS:  Normal aortic arch and great vessel origins. Normal visualized subclavian vessels. RIGHT VERTEBRAL ARTERY CERVICAL SEGMENT:  Normal origin. The vessel is normal in caliber throughout the neck. LEFT VERTEBRAL ARTERY CERVICAL SEGMENT:  Normal origin. The vessel is normal in caliber throughout the neck. RIGHT EXTRACRANIAL CAROTID SEGMENT:  Normal caliber common carotid artery. Normal bifurcation and cervical internal carotid artery. No stenosis or dissection. LEFT EXTRACRANIAL CAROTID SEGMENT:  Normal caliber common carotid artery. Normal bifurcation and cervical internal carotid artery. No stenosis or dissection. NASCET criteria was used to determine the degree of internal carotid artery diameter stenosis. INTRACRANIAL VASCULATURE INTERNAL CAROTID ARTERIES:  Normal enhancement of the intracranial portions of the internal carotid arteries. Normal ophthalmic artery origins. Normal ICA terminus.  ANTERIOR CIRCULATION:  Symmetric A1 segments and anterior cerebral arteries with normal enhancement. Normal anterior communicating artery. MIDDLE CEREBRAL ARTERY CIRCULATION:  M1 segment and middle cerebral artery branches demonstrate normal enhancement bilaterally. DISTAL VERTEBRAL ARTERIES:  Normal distal vertebral arteries. Posterior inferior cerebellar artery origins are normal. Normal vertebral basilar junction. BASILAR ARTERY:  Basilar artery is normal in caliber. Normal superior cerebellar arteries. POSTERIOR CEREBRAL ARTERIES: Both posterior cerebral arteries arises from the basilar tip. Both arteries demonstrate normal enhancement. Normal posterior communicating arteries. VENOUS STRUCTURES:  Normal. NON VASCULAR ANATOMY BONY STRUCTURES:  No acute osseous abnormality. SOFT TISSUES OF THE NECK:  Unremarkable. THORACIC INLET:  Normal.     Impression: 1. No acute intracranial hemorrhage, mass effect or extra-axial collection. 2.  No hemodynamically significant stenosis, dissection or occlusion of the carotid or vertebral arteries. 3.  No intracranial aneurysm. No hemodynamically significant stenosis or occlusion of the major vessels of the [place]. Workstation performed: [id number]     CT head without contrast    Result Date: 8/26/2023  Narrative: CT BRAIN - WITHOUT CONTRAST INDICATION:   headache/lightheaded. COMPARISON:  None. TECHNIQUE:  CT examination of the brain was performed. Multiplanar 2D reformatted images were created from the source data. Radiation dose length product (DLP) for this visit:  866 mGy-cm . This examination, like all CT scans performed in the [facility], was performed utilizing techniques to minimize radiation dose exposure, including the use of iterative reconstruction and automated exposure control. IMAGE QUALITY:  Diagnostic. FINDINGS: PARENCHYMA:  No intracranial mass, mass effect or midline shift. No CT signs of acute infarction. No acute parenchymal hemorrhage. VENTRICLES AND EXTRA-AXIAL SPACES:  Normal for the patient's age. VISUALIZED ORBITS: Normal visualized orbits. PARANASAL SINUSES: Normal visualized paranasal sinuses. CALVARIUM AND EXTRACRANIAL SOFT TISSUES:  Normal.     Impression: No acute intracranial abnormality. Workstation performed: [id number]       Incidental Findings:   · None    Test Results Pending at Discharge (will require follow up):   · As per After Visit Summary     Outpatient Tests Requested:  · Recommended follow-up with PCP for blood pressure management  · Recommended cardiology evaluation and follow-up    Complications:  Refer to hospital course and above listed diagnosis related plan, if any    Hospital Course: As per HPI  Beth Matos is a 68 y.o. female patient who originally presented to the hospital on 8/26/2023  with no known medical history, no routine medical follow-up who presents with symptoms of unsteadiness and difficulty in ambulation since last Wednesday. Patient reported that earlier in the week , she had developed pruritic rash of her face and body for which she was evaluated at urgent care center and was diagnosed to have poison ivy and was given Medrol Dosepak. Next day on Wednesday, she felt a little dizzy which she described as feeling of unsteadiness. Next 2 days her symptoms progressed to the extent that she was feeling unsteady that she was going to fall every time she is attempted to stand up. This was associated with nonbloody nonbilious vomiting. Patient reported that she was able to walk but she had to hold onto support for ambulation. Due to her symptoms she has not been able to eat or move around over the last 2  days and then she presented to ED for further evaluation. Patient denies any headache, fall, trauma, visual or speech abnormality, double vision, tinnitus, recent URI symptoms, chest pain, shortness of breath, palpitation, lightheadedness, abdominal pain or urinary abnormalities. Patient also denied any focal weakness numbness.   Patient was evaluated in ED, patient was afebrile bradycardic and hypotensive saturating adequately on room air. CT head did not reveal any acute abnormality. Lab revealed hemoconcentration mild hypokalemia cardiac markers were negative. UA revealed positive ketones moderate leukocytes, occasional bacteriuria. Patient was given IV fluids and was subsequently admitted. Patient ambulated to the bathroom in the emergency room noted symptoms of dizziness with unsteady gait. Reported that she is currently not feeling symptoms as she is lying in bed. Patient reports that her rash has improved and she does not feel any pruritus.     Patient reports that she does not have a PCP and does not have routine medical evaluation. She does report family history of hypertension diabetes and cardiac disease. Patient was admitted to telemetry, neurochecks remained unremarkable. Patient's symptoms gradually improved and resolved. Work-up was done as above, CTA, MRI did not reveal any acute abnormality. Patient was noted to have bradycardia without any pauses or block, heart rate improved with activity. 2D echo was done which revealed finding as above. Blood pressure was intermittently noted to be elevated. Patient was advised antihypertensive medication plus lifestyle modification. Patient reported that she would like to pursue diet lifestyle modification first and will follow-up with PCP. Patient was seen by neurology, input appreciated. Conservative management monitoring and follow-up with PCP was advised. Patient remained clinically stable and discharged home, advised to follow-up with PCP and cardiology after discharge        Please see above list of diagnoses and related plan for additional information.        Condition at Discharge: stable     Discharge Day Visit / Exam:     Subjective:    Sitting up in chair  Does not offer any complaint  Denies any further dizziness unsteadiness  Ambulating without any difficulties  Denies chest pain shortness of breath or palpitation      Vitals: Blood Pressure: 168/86 (08/28/23 1425)  Pulse: 67 (08/28/23 1425)  Temperature: 97.6 °F (36.4 °C) (08/27/23 2229)  Temp Source: Oral (08/26/23 1831)  Respirations: 20 (08/28/23 0929)  Height: 4' 9" (144.8 cm) (08/28/23 1112)  Weight - Scale: 59 kg (130 lb) (08/28/23 1112)  SpO2: 98 % (08/28/23 1425)  Exam:   Physical Exam  Constitutional:       General: She is not in acute distress. Appearance: She is obese. HENT:      Head: Normocephalic and atraumatic. Cardiovascular:      Rate and Rhythm: Normal rate. Pulmonary:      Effort: Pulmonary effort is normal. No respiratory distress. Breath sounds: Normal breath sounds. No wheezing or rales. Abdominal:      General: Bowel sounds are normal. There is no distension. Palpations: Abdomen is soft. Tenderness: There is no abdominal tenderness. There is no guarding or rebound. Musculoskeletal:      Cervical back: Neck supple. Right lower leg: No edema. Left lower leg: No edema. Skin:     General: Skin is warm and dry. Findings: No rash. Neurological:      General: No focal deficit present. Mental Status: She is alert and oriented to person, place, and time. Mental status is at baseline. Psychiatric:         Mood and Affect: Mood normal.         Discharge instructions/Information to patient and family:(Discharge Medications and Follow up):   See after visit summary for information provided to patient and family. Provisions for Follow-Up Care:  See after visit summary for information related to follow-up care and any pertinent home health orders. Disposition: Home    Planned Readmission:  No     Discharge Statement:  I spent 50 minutes discharging the patient. This time was spent on the day of discharge. I had direct contact with the patient on the day of discharge.  Greater than 50% of the total time was spent examining patient, answering all patient questions, arranging and discussing plan of care with patient as well as directly providing post-discharge instructions. Additional time then spent on discharge activities. Discussed with neurology, input appreciated. Discussed with patient and family, urged to establish PCP for blood pressure monitoring also advised following up with cardiology      Discharge Medications:  See after visit summary for reconciled discharge medications provided to patient and family. ** Please Note: "This note has been constructed using a voice recognition system. Therefore there may be syntax, spelling, and/or grammatical errors.  Please call if you have any questions. "**

## 2023-08-28 NOTE — CONSULTS
Consultation - Neurology   Lu Caicedo 68 y.o. female MRN: [id number]  Unit/Bed#: [address] [place] [address] Encounter: [id number]      Assessment/Plan     * Lightheadedness  Assessment & Plan  68year old female with no known past medical history who presented to the hospital with complaint of lightheadedness and associated ambulatory dysfunction. Patient notes that this started after she started taking Medrol Dosepak for rash. Patient presented to the ED and was noted to be hypertensive and also with bradycardia. She was admitted for further work-up. Etiology of symptoms is unclear at this time, differential includes medication reaction vs related to hypertension/bradycardia vs peripheral vertigo but cannot entirely exclude ischemic stroke. Plan:   - Recommend continue on stroke pathway. - MRI brain without contrast pending.   - Echocardiogram pending.   - Continue to monitor on telemetry. - Lipid panel reviewed, total cholesterol 142, triglycerides 61, HDL 46, LDL 84.   - Hemoglobin A1c reviewed, 5.6.   - Continue on ASA 81 mg QD and atorvastatin 40 mg QPM.   - Goal normotension, normothermia, euglycemia.   - PT/OT   - Stroke education.   - Monitor exam and notify with changes. Bradycardia  Assessment & Plan  - Noted on presentation.   - Further work-up/management as per medicine team.    Elevated BP without diagnosis of hypertension  Assessment & Plan  - No known diagnosis of HTN but patient does not regularly follow-up for medical care. - Recommend goal of normotension.   - Management as per medicine team.        Recommendations for outpatient neurological follow up have yet to be determined. History of Present Illness     Reason for Consult / Principal Problem: Lightheadedness/Dizziness    HPI: Lu Caicedo is a 68 y.o.  female with no known past medical history who presented to the hospital with complaint of lightheadedness.  She notes that she had poison ivy after working in her yard and was prescribed Medrol Dosepak for this. She notes that after she started taking the medication, she started to have lightheadedness and felt unsteady on her feet when she tried to ambulate. She notes that she was not able to get out of bed without feeling lightheadedness and off balance. She also notes that she had vomiting with this. She notes that she stayed in bed for 2 days and then presented to the ED for further evaluation on August 26, 2023. She was noted to have mild hypokalemia, bradycardia and was hypertensive when she presented to the hospital. She was admitted to the hospital for further work-up. She notes she has never had similar symptoms before in the past.     She notes that she currently is feeling well and denies any symptoms. She notes that she feels the lightheadedness has resolved since coming to the hospital. She notes that if she moves too quickly, she will occasionally feel off balance. She denies any room-spinning sensation. She notes that rash had resolved. She also notes she has been able to ambulate without difficulty here in the hospital.    Inpatient consult to Neurology  Consult performed by: Douglas Ac PA-C  Consult ordered by: Olayinka Boland MD          Review of Systems   Constitutional: Negative for chills, fatigue and fever. HENT: Negative for trouble swallowing. Eyes: Negative for visual disturbance. Respiratory: Negative for shortness of breath. Cardiovascular: Negative for chest pain. Gastrointestinal: Negative for abdominal pain, nausea and vomiting. Genitourinary: Negative for difficulty urinating and dysuria. Musculoskeletal: Negative for back pain, gait problem and neck pain. Skin: Negative for rash. Neurological: Negative for dizziness, facial asymmetry, speech difficulty, weakness, light-headedness, numbness and headaches. Psychiatric/Behavioral: Negative for confusion. Historical Information   History reviewed.  No pertinent past medical history. History reviewed. No pertinent surgical history. Social History   Social History     Substance and Sexual Activity   Alcohol Use Yes    Comment: occasional     Social History     Substance and Sexual Activity   Drug Use Never     E-Cigarette/Vaping   • E-Cigarette Use Never User      E-Cigarette/Vaping Substances     Social History     Tobacco Use   Smoking Status Never   Smokeless Tobacco Never     Family History: History reviewed. No pertinent family history. Review of previous medical records was completed. Patient was prescribed Medrol Dosepak on August 21, 2023 after a visit to urgent care facility. Meds/Allergies   Scheduled Meds:  Current Facility-Administered Medications   Medication Dose Route Frequency Provider Last Rate   • acetaminophen  650 mg Oral Q4H PRN Clint Dailey MD     • aspirin  81 mg Oral Daily Clint Dailey MD     • atorvastatin  40 mg Oral QPM Clint Dailey MD     • enoxaparin  40 mg Subcutaneous Daily Clint Dailey MD     • lactated ringers  125 mL/hr Intravenous Continuous Clint Dailey  mL/hr (08/28/23 0803)   • LORazepam  1 mg Oral Once in imaging Clint Dailey MD     • meclizine  25 mg Oral [facility] Clint Dailey MD     • ondansetron  4 mg Intravenous Q6H PRN Clint Dailey MD       Continuous Infusions:lactated ringers, 125 mL/hr, Last Rate: 125 mL/hr (08/28/23 0803)      PRN Meds:.•  acetaminophen  •  LORazepam  •  ondansetron      No Known Allergies    Objective   Vitals:Blood pressure 160/92, pulse (!) 53, temperature 97.6 °F (36.4 °C), resp. rate 12, height 4' 9" (1.448 m), weight 59 kg (130 lb), SpO2 96 %. ,Body mass index is 28.13 kg/m². Intake/Output Summary (Last 24 hours) at 8/28/2023 0812  Last data filed at 8/28/2023 0803  Gross per 24 hour   Intake 3819.17 ml   Output 1900 ml   Net 1919.17 ml       Invasive Devices:    Invasive Devices     Peripheral Intravenous Line  Duration           Peripheral IV 08/27/23 Right Antecubital <1 day                Physical Exam  Constitutional:       General: She is not in acute distress. Appearance: Normal appearance. She is not ill-appearing, toxic-appearing or diaphoretic. HENT:      Head: Normocephalic and atraumatic. Mouth/Throat:      Mouth: Mucous membranes are moist.      Pharynx: Oropharynx is clear. No oropharyngeal exudate or posterior oropharyngeal erythema. Eyes:      General: No scleral icterus. Right eye: No discharge. Left eye: No discharge. Extraocular Movements: Extraocular movements intact. Conjunctiva/sclera: Conjunctivae normal.      Pupils: Pupils are equal, round, and reactive to light. Cardiovascular:      Rate and Rhythm: Normal rate and regular rhythm. Pulmonary:      Effort: No respiratory distress. Breath sounds: Normal breath sounds. Abdominal:      General: There is no distension. Palpations: Abdomen is soft. Tenderness: There is no abdominal tenderness. Musculoskeletal:         General: Normal range of motion. Cervical back: Normal range of motion and neck supple. Right lower leg: No edema. Left lower leg: No edema. Skin:     General: Skin is warm and dry. Findings: No erythema or rash. Neurological:      Mental Status: She is alert and oriented to person, place, and time. Coordination: Finger-Nose-Finger Test and Heel to [place] Test normal.      Deep Tendon Reflexes:      Reflex Scores:       Bicep reflexes are 2+ on the right side and 2+ on the left side. Brachioradialis reflexes are 2+ on the right side and 2+ on the left side. Patellar reflexes are 2+ on the right side and 2+ on the left side. Achilles reflexes are 2+ on the right side and 2+ on the left side. Psychiatric:         Mood and Affect: Mood normal.         Speech: Speech normal.         Behavior: Behavior normal.       Neurologic Exam     Mental Status   Oriented to person, place, and time. Oriented to person. Oriented to place. Oriented to time. Attention: normal. Concentration: normal.   Speech: speech is normal   Level of consciousness: alert  Knowledge: good. Able to name object. Able to repeat. Normal comprehension. Cranial Nerves     CN II   Right visual field deficit: none  Left visual field deficit: none     CN III, IV, VI   Pupils are equal, round, and reactive to light. Right pupil: Size: 3 mm. Shape: regular. Reactivity: brisk. Consensual response: intact. Left pupil: Size: 3 mm. Shape: regular. Reactivity: brisk. Consensual response: intact. Nystagmus: none   Diplopia: none  Ophthalmoparesis: none  Upgaze: normal  Downgaze: normal  Conjugate gaze: present    CN V   Right facial sensation deficit: none  Left facial sensation deficit: none    CN VII   Right facial weakness: none  Left facial weakness: none    CN VIII   Hearing: intact    CN IX, X   Palate: symmetric    CN XI   Right sternocleidomastoid strength: normal  Left sternocleidomastoid strength: normal    CN XII   Tongue: not atrophic  Fasciculations: absent  Tongue deviation: none    Motor Exam   Muscle bulk: normal  Overall muscle tone: normal  Right arm tone: normal  Left arm tone: normal  Right arm pronator drift: absent  Left arm pronator drift: absent  Right leg tone: normal  Left leg tone: normalMotor strength 5/5 B/L UE and LE.      Sensory Exam   Right arm light touch: normal  Left arm light touch: normal  Right leg light touch: normal  Left leg light touch: normal  Right arm vibration: normal  Left arm vibration: normal  Right leg vibration: normal  Left leg vibration: normal  Sensation intact to temperature B/L UE and LE     Gait, Coordination, and Reflexes     Gait  Gait: (Gait deferred for safety)    Coordination   Finger to nose coordination: normal  Heel to shin coordination: normal    Tremor   Resting tremor: absent  Intention tremor: absent  Action tremor: absent    Reflexes   Right brachioradialis: 2+  Left brachioradialis: 2+  Right biceps: 2+  Left biceps: 2+  Right patellar: 2+  Left patellar: 2+  Right achilles: 2+  Left achilles: 2+  Right plantar: normal  Left plantar: normal      Lab Results:  Recent Results (from the past 24 hour(s))   CBC (With Platelets)    Collection Time: 08/27/23  9:59 AM   Result Value Ref Range    WBC 6.77 4.31 - 10.16 Thousand/uL    RBC 5.47 (H) 3.81 - 5.12 Million/uL    Hemoglobin 15.8 (H) 11.5 - 15.4 g/dL    Hematocrit 46.4 (H) 34.8 - 46.1 %    MCV 85 82 - 98 fL    MCH 28.9 26.8 - 34.3 pg    MCHC 34.1 31.4 - 37.4 g/dL    RDW 13.5 11.6 - 15.1 %    Platelets 407 188 - 798 Thousands/uL    MPV 11.5 8.9 - 12.7 fL   Comprehensive metabolic panel    Collection Time: 08/27/23  9:59 AM   Result Value Ref Range    Sodium 137 135 - 147 mmol/L    Potassium 3.3 (L) 3.5 - 5.3 mmol/L    Chloride 102 96 - 108 mmol/L    CO2 27 21 - 32 mmol/L    ANION GAP 8 mmol/L    BUN 11 5 - 25 mg/dL    Creatinine 0.69 0.60 - 1.30 mg/dL    Glucose 117 65 - 140 mg/dL    Calcium 8.7 8.4 - 10.2 mg/dL    AST 12 (L) 13 - 39 U/L    ALT 13 7 - 52 U/L    Alkaline Phosphatase 67 34 - 104 U/L    Total Protein 6.8 6.4 - 8.4 g/dL    Albumin 3.6 3.5 - 5.0 g/dL    Total Bilirubin 0.90 0.20 - 1.00 mg/dL    eGFR 86 ml/min/1.73sq m   Magnesium    Collection Time: 08/27/23  9:59 AM   Result Value Ref Range    Magnesium 1.9 1.9 - 2.7 mg/dL   Lipid Panel with Direct LDL reflex    Collection Time: 08/27/23  9:59 AM   Result Value Ref Range    Cholesterol 142 See Comment mg/dL    Triglycerides 61 See Comment mg/dL    HDL, Direct 46 (L) >=50 mg/dL    LDL Calculated 84 0 - 100 mg/dL   Hemoglobin A1c w/EAG Estimation    Collection Time: 08/27/23  9:59 AM   Result Value Ref Range    Hemoglobin A1C 5.6 Normal 4.0-5.6%; PreDiabetic 5.7-6.4%;  Diabetic >=6.5%; Glycemic control for adults with diabetes <7.0% %     mg/dl   TSH, 3rd generation with Free T4 reflex    Collection Time: 08/27/23  9:59 AM   Result Value Ref Range    TSH 3RD GENERATON 4.139 0.450 - 4.500 uIU/mL   Urinalysis    Collection Time: 08/27/23 10:11 AM   Result Value Ref Range    Color, UA Light Yellow     Clarity, UA Clear     Specific Gravity, UA <=1.005 1.000 - 1.030    pH, UA 6.5 5.0, 5.5, 6.0, 6.5, 7.0, 7.5, 8.0, 8.5, 9.0    Leukocytes, UA Trace (A) Negative    Nitrite, UA Negative Negative    Protein, UA Negative Negative mg/dl    Glucose, UA Negative Negative mg/dl    Ketones, UA Negative Negative mg/dl    Urobilinogen, UA 1.0 0.2, 1.0 E.U./dl E.U./dl    Bilirubin, UA Negative Negative    Occult Blood, UA Negative Negative    RBC, UA None Seen None Seen, 2-4 /hpf    WBC, UA 1-2 (A) None Seen, 2-4, 5-60 /hpf    Epithelial Cells Occasional None Seen, Occasional /hpf    Bacteria, UA Occasional None Seen, Occasional /hpf       Imaging Studies: I have personally reviewed pertinent reports. and I have personally reviewed pertinent films in PACS . CTH- No acute intracranial abnormality. CTA head and neck with and without contrast- No acute intracranial hemorrhage, mass effect, or extra-axial collection. No hemodynamically significant stenosis, dissection, or occlusion of the carotid or vertebral arteries. No intracranial aneurysm. No hemodynamically significant stenosis or occlusion of the major vessels of the [place]. VTE Prophylaxis: Enoxaparin (Lovenox)    Counseling / Coordination of Care  Total time spent today 60 minutes. Greater than 50% of total time was spent with the patient and / or family counseling and / or coordination of care. A description of the counseling / coordination of care: Time spent reviewing plan of care with patient at bedside. Awaiting MRI brain to be completed this morning. Further plan of care pending results of MRI brain.

## 2023-08-29 LAB
ATRIAL RATE: 49 BPM
P AXIS: 25 DEGREES
PR INTERVAL: 162 MS
QRS AXIS: 6 DEGREES
QRSD INTERVAL: 82 MS
QT INTERVAL: 512 MS
QTC INTERVAL: 462 MS
T WAVE AXIS: 89 DEGREES
VENTRICULAR RATE: 49 BPM

## 2023-08-29 PROCEDURE — 93010 ELECTROCARDIOGRAM REPORT: CPT | Performed by: INTERNAL MEDICINE

## 2023-08-29 NOTE — UTILIZATION REVIEW
NOTIFICATION OF ADMISSION DISCHARGE   This is a Notification of Discharge from [address] [place] [address]. Please be advised that this patient has been discharge from our facility. Below you will find the admission and discharge date and time including the patient’s disposition. UTILIZATION REVIEW CONTACT:  Arlyn Joaquin  Utilization   Network Utilization Review Department  Phone: [phone] x carefully listen to the prompts. All voicemails are confidential.  Email: [email or web address]     ADMISSION INFORMATION  PRESENTATION DATE: 8/26/2023 11:53 AM  OBERVATION ADMISSION DATE:   INPATIENT ADMISSION DATE: 8/28/23  5:04 AM   DISCHARGE DATE: 8/28/2023  7:29 PM   DISPOSITION:Home/Self Care    IMPORTANT INFORMATION:  Send all requests for admission clinical reviews, approved or denied determinations and any other requests to dedicated fax number below belonging to the campus where the patient is receiving treatment.  List of dedicated fax numbers:  [place] DENIALS (Administrative/Medical Necessity) [phone] [address] [facility] (Maternity/NICU/Pediatrics) [phone]   [facility] [phone]   [facility] [phone] [address] [phone]   [address] [place] [address] [phone]   [facility] [phone]   [address] [place] [address] [address] [facility] [phone]   [address] [place] [address] [phone] [address] [facility] [phone] [address] [facility] [address] [facility] [phone]